# Patient Record
Sex: MALE | Race: WHITE
[De-identification: names, ages, dates, MRNs, and addresses within clinical notes are randomized per-mention and may not be internally consistent; named-entity substitution may affect disease eponyms.]

---

## 2019-08-03 ENCOUNTER — HOSPITAL ENCOUNTER (INPATIENT)
Dept: HOSPITAL 15 - ER | Age: 54
LOS: 1 days | Discharge: HOME HEALTH | DRG: 383 | End: 2019-08-04
Attending: NURSE PRACTITIONER | Admitting: NURSE PRACTITIONER
Payer: MEDICAID

## 2019-08-03 VITALS — WEIGHT: 223 LBS | BODY MASS INDEX: 33.8 KG/M2 | HEIGHT: 68 IN

## 2019-08-03 DIAGNOSIS — Z88.5: ICD-10-CM

## 2019-08-03 DIAGNOSIS — I16.1: ICD-10-CM

## 2019-08-03 DIAGNOSIS — Z79.899: ICD-10-CM

## 2019-08-03 DIAGNOSIS — L02.414: ICD-10-CM

## 2019-08-03 DIAGNOSIS — L03.114: Primary | ICD-10-CM

## 2019-08-03 DIAGNOSIS — L02.91: ICD-10-CM

## 2019-08-03 DIAGNOSIS — I10: ICD-10-CM

## 2019-08-03 DIAGNOSIS — E78.5: ICD-10-CM

## 2019-08-03 DIAGNOSIS — F17.210: ICD-10-CM

## 2019-08-03 LAB
ALBUMIN SERPL-MCNC: 2.9 G/DL (ref 3.4–5)
ALP SERPL-CCNC: 118 U/L (ref 45–117)
ALT SERPL-CCNC: 23 U/L (ref 16–61)
ANION GAP SERPL CALCULATED.3IONS-SCNC: 7 MMOL/L (ref 5–15)
BILIRUB SERPL-MCNC: 0.2 MG/DL (ref 0.2–1)
BUN SERPL-MCNC: 15 MG/DL (ref 7–18)
BUN/CREAT SERPL: 17
CALCIUM SERPL-MCNC: 8.9 MG/DL (ref 8.5–10.1)
CHLORIDE SERPL-SCNC: 105 MMOL/L (ref 98–107)
CO2 SERPL-SCNC: 28 MMOL/L (ref 21–32)
GLUCOSE SERPL-MCNC: 110 MG/DL (ref 74–106)
HCT VFR BLD AUTO: 45.5 % (ref 41–53)
HGB BLD-MCNC: 15.4 G/DL (ref 13.5–17.5)
MCH RBC QN AUTO: 30.5 PG (ref 28–32)
MCV RBC AUTO: 90.2 FL (ref 80–100)
NRBC BLD QL AUTO: 0 %
POTASSIUM SERPL-SCNC: 3.7 MMOL/L (ref 3.5–5.1)
PROT SERPL-MCNC: 7.4 G/DL (ref 6.4–8.2)
SODIUM SERPL-SCNC: 140 MMOL/L (ref 136–145)

## 2019-08-03 PROCEDURE — 96366 THER/PROPH/DIAG IV INF ADDON: CPT

## 2019-08-03 PROCEDURE — 87077 CULTURE AEROBIC IDENTIFY: CPT

## 2019-08-03 PROCEDURE — 87205 SMEAR GRAM STAIN: CPT

## 2019-08-03 PROCEDURE — 70450 CT HEAD/BRAIN W/O DYE: CPT

## 2019-08-03 PROCEDURE — 87186 SC STD MICRODIL/AGAR DIL: CPT

## 2019-08-03 PROCEDURE — 73200 CT UPPER EXTREMITY W/O DYE: CPT

## 2019-08-03 PROCEDURE — 83605 ASSAY OF LACTIC ACID: CPT

## 2019-08-03 PROCEDURE — 81001 URINALYSIS AUTO W/SCOPE: CPT

## 2019-08-03 PROCEDURE — 96365 THER/PROPH/DIAG IV INF INIT: CPT

## 2019-08-03 PROCEDURE — 80048 BASIC METABOLIC PNL TOTAL CA: CPT

## 2019-08-03 PROCEDURE — 36415 COLL VENOUS BLD VENIPUNCTURE: CPT

## 2019-08-03 PROCEDURE — 80053 COMPREHEN METABOLIC PANEL: CPT

## 2019-08-03 PROCEDURE — 85025 COMPLETE CBC W/AUTO DIFF WBC: CPT

## 2019-08-03 PROCEDURE — 87040 BLOOD CULTURE FOR BACTERIA: CPT

## 2019-08-03 PROCEDURE — 96367 TX/PROPH/DG ADDL SEQ IV INF: CPT

## 2019-08-03 PROCEDURE — 96375 TX/PRO/DX INJ NEW DRUG ADDON: CPT

## 2019-08-04 VITALS — DIASTOLIC BLOOD PRESSURE: 98 MMHG | SYSTOLIC BLOOD PRESSURE: 135 MMHG

## 2019-08-04 LAB
ANION GAP SERPL CALCULATED.3IONS-SCNC: 8 MMOL/L (ref 5–15)
BUN SERPL-MCNC: 16 MG/DL (ref 7–18)
BUN/CREAT SERPL: 20.5
CALCIUM SERPL-MCNC: 8.3 MG/DL (ref 8.5–10.1)
CHLORIDE SERPL-SCNC: 105 MMOL/L (ref 98–107)
CO2 SERPL-SCNC: 28 MMOL/L (ref 21–32)
GLUCOSE SERPL-MCNC: 116 MG/DL (ref 74–106)
HCT VFR BLD AUTO: 45.2 % (ref 41–53)
HGB BLD-MCNC: 15.3 G/DL (ref 13.5–17.5)
MCH RBC QN AUTO: 30.7 PG (ref 28–32)
MCV RBC AUTO: 90.8 FL (ref 80–100)
NRBC BLD QL AUTO: 0 %
POTASSIUM SERPL-SCNC: 3.3 MMOL/L (ref 3.5–5.1)
SODIUM SERPL-SCNC: 141 MMOL/L (ref 136–145)

## 2019-08-04 RX ADMIN — CLINDAMYCIN PHOSPHATE SCH MLS/HR: 150 INJECTION, SOLUTION INTRAVENOUS at 05:32

## 2019-08-04 RX ADMIN — CLINDAMYCIN PHOSPHATE SCH MLS/HR: 150 INJECTION, SOLUTION INTRAVENOUS at 14:12

## 2019-08-04 RX ADMIN — NICARDIPINE HYDROCHLORIDE SCH MLS/HR: 25 INJECTION INTRAVENOUS at 11:36

## 2019-08-04 RX ADMIN — NICARDIPINE HYDROCHLORIDE SCH MLS/HR: 25 INJECTION INTRAVENOUS at 07:14

## 2019-08-04 NOTE — NUR
WOUND CARE NOTE: 

Wound care in to see patient per wound care request regarding Left elbow wound.  Patient is  
54 years old male with admitting diagnosis of  Left Upper Extremity Cellulitis. He has 
history of High Lipids and Htn.  Patient is resting  in E.R. bed #6.  He's waiting for ICU 
bed. Patient's eyes are closed, respond to verbal and tactile stimuli. Mild pain noted upon 
touching his Lt upper extremity.  ER nurse Myrna reported that patient is ambulatory and 
able to turn and reposition self. His   Tera score is 17. 



Noted 4x5cm open full thickness wound with no measurable depth to patient's L elbow. Wound 
is bright red, edematous with yellow necrotic tissue at center. L elbow wound is draining 
moderate amount of serous drainage, no odor noted. Per RN  nursing notes, wound culture 
specimen already sent to lab for processing. Patient is very sleepy and unable to give 
information about his  elbow wound. FLORENCE Norris reported that wound is from suspected "spider 
bite". Cleansed patient's Lt elbow wound with wound cleanser, patted dry with sterile gauze, 
applied  Thera honey gel at wound base,covered with non-adhesive Opti foam, wrapped with 
Kerlix and secured with tape. Patient tolerated well. Unable to assess patient's sacral and 
back as patient is very sleepy, just medicated for nausea. Per FLORENCE Norris's report, patient 
can ambulate, turn and reposition himself and no other wound or pressure injury noted on 
skin examination.



RECOMMENDATION: Daily/PRN dressing change to L elbow wound per MD order,  Dietary consult, 
elevate  affected extremity on pillows,redistribute pressure points with pillows, continue 
monitoring by wound care while patient is hospitalized.  

-------------------------------------------------------------------------------

Addendum: 08/04/19 at 1242 by Maral Chan RN

-------------------------------------------------------------------------------

Amended: Links added.

## 2020-11-23 ENCOUNTER — HOSPITAL ENCOUNTER (INPATIENT)
Dept: HOSPITAL 15 - ER | Age: 55
LOS: 15 days | DRG: 130 | End: 2020-12-08
Attending: INTERNAL MEDICINE | Admitting: EMERGENCY MEDICINE
Payer: MEDICAID

## 2020-11-23 VITALS — BODY MASS INDEX: 30.9 KG/M2 | WEIGHT: 215.83 LBS | HEIGHT: 70 IN

## 2020-11-23 DIAGNOSIS — Z91.19: ICD-10-CM

## 2020-11-23 DIAGNOSIS — E66.9: ICD-10-CM

## 2020-11-23 DIAGNOSIS — F41.9: ICD-10-CM

## 2020-11-23 DIAGNOSIS — E87.6: ICD-10-CM

## 2020-11-23 DIAGNOSIS — I46.9: ICD-10-CM

## 2020-11-23 DIAGNOSIS — I48.0: ICD-10-CM

## 2020-11-23 DIAGNOSIS — Z88.5: ICD-10-CM

## 2020-11-23 DIAGNOSIS — I10: ICD-10-CM

## 2020-11-23 DIAGNOSIS — J12.89: ICD-10-CM

## 2020-11-23 DIAGNOSIS — J15.9: ICD-10-CM

## 2020-11-23 DIAGNOSIS — K21.9: ICD-10-CM

## 2020-11-23 DIAGNOSIS — J96.01: ICD-10-CM

## 2020-11-23 DIAGNOSIS — I27.20: ICD-10-CM

## 2020-11-23 DIAGNOSIS — F17.210: ICD-10-CM

## 2020-11-23 DIAGNOSIS — I25.2: ICD-10-CM

## 2020-11-23 DIAGNOSIS — B95.62: ICD-10-CM

## 2020-11-23 DIAGNOSIS — E78.5: ICD-10-CM

## 2020-11-23 DIAGNOSIS — R74.01: ICD-10-CM

## 2020-11-23 DIAGNOSIS — G93.41: ICD-10-CM

## 2020-11-23 DIAGNOSIS — N17.0: ICD-10-CM

## 2020-11-23 DIAGNOSIS — U07.1: Primary | ICD-10-CM

## 2020-11-23 LAB
ALBUMIN SERPL-MCNC: 3.4 G/DL (ref 3.4–5)
ALP SERPL-CCNC: 113 U/L (ref 45–117)
ALT SERPL-CCNC: 154 U/L (ref 16–61)
ANION GAP SERPL CALCULATED.3IONS-SCNC: 10 MMOL/L (ref 5–15)
BILIRUB SERPL-MCNC: 0.8 MG/DL (ref 0.2–1)
BUN SERPL-MCNC: 30 MG/DL (ref 7–18)
BUN/CREAT SERPL: 19.7
CALCIUM SERPL-MCNC: 9.1 MG/DL (ref 8.5–10.1)
CHLORIDE SERPL-SCNC: 100 MMOL/L (ref 98–107)
CO2 SERPL-SCNC: 25 MMOL/L (ref 21–32)
GLUCOSE SERPL-MCNC: 139 MG/DL (ref 74–106)
HCT VFR BLD AUTO: 54.8 % (ref 41–53)
HGB BLD-MCNC: 19.1 G/DL (ref 13.5–17.5)
MCH RBC QN AUTO: 32.2 PG (ref 28–32)
MCV RBC AUTO: 92.6 FL (ref 80–100)
NRBC BLD QL AUTO: 0.2 %
POTASSIUM SERPL-SCNC: 2.8 MMOL/L (ref 3.5–5.1)
PROT SERPL-MCNC: 8.9 G/DL (ref 6.4–8.2)
SODIUM SERPL-SCNC: 135 MMOL/L (ref 136–145)

## 2020-11-23 PROCEDURE — 94660 CPAP INITIATION&MGMT: CPT

## 2020-11-23 PROCEDURE — 80076 HEPATIC FUNCTION PANEL: CPT

## 2020-11-23 PROCEDURE — 71045 X-RAY EXAM CHEST 1 VIEW: CPT

## 2020-11-23 PROCEDURE — 85379 FIBRIN DEGRADATION QUANT: CPT

## 2020-11-23 PROCEDURE — 0BH17EZ INSERTION OF ENDOTRACHEAL AIRWAY INTO TRACHEA, VIA NATURAL OR ARTIFICIAL OPENING: ICD-10-PCS | Performed by: INTERNAL MEDICINE

## 2020-11-23 PROCEDURE — 81001 URINALYSIS AUTO W/SCOPE: CPT

## 2020-11-23 PROCEDURE — 94002 VENT MGMT INPAT INIT DAY: CPT

## 2020-11-23 PROCEDURE — 36600 WITHDRAWAL OF ARTERIAL BLOOD: CPT

## 2020-11-23 PROCEDURE — 93005 ELECTROCARDIOGRAM TRACING: CPT

## 2020-11-23 PROCEDURE — 94640 AIRWAY INHALATION TREATMENT: CPT

## 2020-11-23 PROCEDURE — 5A1955Z RESPIRATORY VENTILATION, GREATER THAN 96 CONSECUTIVE HOURS: ICD-10-PCS | Performed by: INTERNAL MEDICINE

## 2020-11-23 PROCEDURE — 85025 COMPLETE CBC W/AUTO DIFF WBC: CPT

## 2020-11-23 PROCEDURE — 86141 C-REACTIVE PROTEIN HS: CPT

## 2020-11-23 PROCEDURE — 94003 VENT MGMT INPAT SUBQ DAY: CPT

## 2020-11-23 PROCEDURE — 85730 THROMBOPLASTIN TIME PARTIAL: CPT

## 2020-11-23 PROCEDURE — 97110 THERAPEUTIC EXERCISES: CPT

## 2020-11-23 PROCEDURE — 82728 ASSAY OF FERRITIN: CPT

## 2020-11-23 PROCEDURE — 80048 BASIC METABOLIC PNL TOTAL CA: CPT

## 2020-11-23 PROCEDURE — 82962 GLUCOSE BLOOD TEST: CPT

## 2020-11-23 PROCEDURE — 87081 CULTURE SCREEN ONLY: CPT

## 2020-11-23 PROCEDURE — 83880 ASSAY OF NATRIURETIC PEPTIDE: CPT

## 2020-11-23 PROCEDURE — 87426 SARSCOV CORONAVIRUS AG IA: CPT

## 2020-11-23 PROCEDURE — 83615 LACTATE (LD) (LDH) ENZYME: CPT

## 2020-11-23 PROCEDURE — 93306 TTE W/DOPPLER COMPLETE: CPT

## 2020-11-23 PROCEDURE — 87186 SC STD MICRODIL/AGAR DIL: CPT

## 2020-11-23 PROCEDURE — 93970 EXTREMITY STUDY: CPT

## 2020-11-23 PROCEDURE — 71275 CT ANGIOGRAPHY CHEST: CPT

## 2020-11-23 PROCEDURE — 87205 SMEAR GRAM STAIN: CPT

## 2020-11-23 PROCEDURE — 85610 PROTHROMBIN TIME: CPT

## 2020-11-23 PROCEDURE — 83735 ASSAY OF MAGNESIUM: CPT

## 2020-11-23 PROCEDURE — 87070 CULTURE OTHR SPECIMN AEROBIC: CPT

## 2020-11-23 PROCEDURE — 82805 BLOOD GASES W/O2 SATURATION: CPT

## 2020-11-23 PROCEDURE — 36415 COLL VENOUS BLD VENIPUNCTURE: CPT

## 2020-11-23 PROCEDURE — 84484 ASSAY OF TROPONIN QUANT: CPT

## 2020-11-23 PROCEDURE — 87077 CULTURE AEROBIC IDENTIFY: CPT

## 2020-11-23 PROCEDURE — 02HV33Z INSERTION OF INFUSION DEVICE INTO SUPERIOR VENA CAVA, PERCUTANEOUS APPROACH: ICD-10-PCS

## 2020-11-23 PROCEDURE — 80053 COMPREHEN METABOLIC PANEL: CPT

## 2020-11-23 PROCEDURE — 97530 THERAPEUTIC ACTIVITIES: CPT

## 2020-11-23 RX ADMIN — POTASSIUM CHLORIDE SCH MLS/HR: 200 INJECTION, SOLUTION INTRAVENOUS at 17:07

## 2020-11-23 RX ADMIN — POTASSIUM CHLORIDE SCH MLS/HR: 200 INJECTION, SOLUTION INTRAVENOUS at 13:53

## 2020-11-23 RX ADMIN — ENOXAPARIN SODIUM SCH MG: 100 INJECTION SUBCUTANEOUS at 21:38

## 2020-11-23 RX ADMIN — BUDESONIDE SCH MCG: 180 AEROSOL, POWDER RESPIRATORY (INHALATION) at 19:36

## 2020-11-23 RX ADMIN — ALBUTEROL SULFATE SCH MCG: 108 AEROSOL, METERED RESPIRATORY (INHALATION) at 19:36

## 2020-11-23 RX ADMIN — ALBUTEROL SULFATE SCH MCG: 108 AEROSOL, METERED RESPIRATORY (INHALATION) at 14:25

## 2020-11-23 RX ADMIN — OXYCODONE HYDROCHLORIDE AND ACETAMINOPHEN SCH MG: 500 TABLET ORAL at 21:39

## 2020-11-24 VITALS — SYSTOLIC BLOOD PRESSURE: 139 MMHG | DIASTOLIC BLOOD PRESSURE: 101 MMHG

## 2020-11-24 LAB
ALBUMIN SERPL-MCNC: 2.8 G/DL (ref 3.4–5)
ALP SERPL-CCNC: 92 U/L (ref 45–117)
ALT SERPL-CCNC: 115 U/L (ref 16–61)
ANION GAP SERPL CALCULATED.3IONS-SCNC: 6 MMOL/L (ref 5–15)
ANION GAP SERPL CALCULATED.3IONS-SCNC: 8 MMOL/L (ref 5–15)
BILIRUB SERPL-MCNC: 0.4 MG/DL (ref 0.2–1)
BUN SERPL-MCNC: 24 MG/DL (ref 7–18)
BUN SERPL-MCNC: 25 MG/DL (ref 7–18)
BUN/CREAT SERPL: 23.8
BUN/CREAT SERPL: 24.5
CALCIUM SERPL-MCNC: 8 MG/DL (ref 8.5–10.1)
CALCIUM SERPL-MCNC: 8.3 MG/DL (ref 8.5–10.1)
CHLORIDE SERPL-SCNC: 106 MMOL/L (ref 98–107)
CHLORIDE SERPL-SCNC: 106 MMOL/L (ref 98–107)
CO2 SERPL-SCNC: 23 MMOL/L (ref 21–32)
CO2 SERPL-SCNC: 23 MMOL/L (ref 21–32)
GLUCOSE SERPL-MCNC: 150 MG/DL (ref 74–106)
GLUCOSE SERPL-MCNC: 167 MG/DL (ref 74–106)
HCT VFR BLD AUTO: 50.9 % (ref 41–53)
HGB BLD-MCNC: 17.1 G/DL (ref 13.5–17.5)
MAGNESIUM SERPL-MCNC: 2.1 MG/DL (ref 1.6–2.6)
MAGNESIUM SERPL-MCNC: 2.3 MG/DL (ref 1.6–2.6)
MCH RBC QN AUTO: 31.2 PG (ref 28–32)
MCV RBC AUTO: 92.8 FL (ref 80–100)
NRBC BLD QL AUTO: 0.8 %
POTASSIUM SERPL-SCNC: 3.5 MMOL/L (ref 3.5–5.1)
POTASSIUM SERPL-SCNC: 3.7 MMOL/L (ref 3.5–5.1)
PROT SERPL-MCNC: 7.8 G/DL (ref 6.4–8.2)
SODIUM SERPL-SCNC: 135 MMOL/L (ref 136–145)
SODIUM SERPL-SCNC: 137 MMOL/L (ref 136–145)

## 2020-11-24 RX ADMIN — ALBUTEROL SULFATE SCH MCG: 108 AEROSOL, METERED RESPIRATORY (INHALATION) at 06:00

## 2020-11-24 RX ADMIN — ALBUTEROL SULFATE SCH MCG: 108 AEROSOL, METERED RESPIRATORY (INHALATION) at 14:35

## 2020-11-24 RX ADMIN — PANTOPRAZOLE SODIUM SCH MG: 40 INJECTION, POWDER, FOR SOLUTION INTRAVENOUS at 10:31

## 2020-11-24 RX ADMIN — BUDESONIDE SCH MCG: 180 AEROSOL, POWDER RESPIRATORY (INHALATION) at 07:07

## 2020-11-24 RX ADMIN — OXYCODONE HYDROCHLORIDE AND ACETAMINOPHEN SCH MG: 500 TABLET ORAL at 10:32

## 2020-11-24 RX ADMIN — ZINC SULFATE CAP 220 MG (50 MG ELEMENTAL ZN) SCH MG: 220 (50 ZN) CAP at 10:31

## 2020-11-24 RX ADMIN — ENOXAPARIN SODIUM SCH MG: 100 INJECTION SUBCUTANEOUS at 10:32

## 2020-11-24 RX ADMIN — Medication SCH UNIT: at 10:32

## 2020-11-24 RX ADMIN — METOPROLOL TARTRATE SCH MG: 50 TABLET, FILM COATED ORAL at 22:28

## 2020-11-24 RX ADMIN — ALBUTEROL SULFATE SCH MCG: 108 AEROSOL, METERED RESPIRATORY (INHALATION) at 21:15

## 2020-11-24 RX ADMIN — ALBUTEROL SULFATE SCH MCG: 108 AEROSOL, METERED RESPIRATORY (INHALATION) at 07:07

## 2020-11-24 RX ADMIN — GUAIFENESIN AND DEXTROMETHORPHAN PRN ML: 100; 10 SYRUP ORAL at 15:19

## 2020-11-24 RX ADMIN — OXYCODONE HYDROCHLORIDE AND ACETAMINOPHEN SCH MG: 500 TABLET ORAL at 22:29

## 2020-11-24 RX ADMIN — GUAIFENESIN AND DEXTROMETHORPHAN PRN ML: 100; 10 SYRUP ORAL at 22:32

## 2020-11-24 RX ADMIN — BUDESONIDE SCH MCG: 180 AEROSOL, POWDER RESPIRATORY (INHALATION) at 21:15

## 2020-11-24 NOTE — NUR
PT ON  7L SM WITH SPO2 94% AND WAS EDUCATED REGARDING PRONING TO IMPROVE OXYGENATION. PT 
VERBALIZED UNDERSTANDING.

## 2020-11-25 LAB
ANION GAP SERPL CALCULATED.3IONS-SCNC: 8 MMOL/L (ref 5–15)
BUN SERPL-MCNC: 22 MG/DL (ref 7–18)
BUN/CREAT SERPL: 25
CALCIUM SERPL-MCNC: 7.9 MG/DL (ref 8.5–10.1)
CHLORIDE SERPL-SCNC: 105 MMOL/L (ref 98–107)
CO2 SERPL-SCNC: 24 MMOL/L (ref 21–32)
GLUCOSE SERPL-MCNC: 146 MG/DL (ref 74–106)
HCT VFR BLD AUTO: 47.8 % (ref 41–53)
HGB BLD-MCNC: 16.2 G/DL (ref 13.5–17.5)
MCH RBC QN AUTO: 31.7 PG (ref 28–32)
MCV RBC AUTO: 93.3 FL (ref 80–100)
NRBC BLD QL AUTO: 0.1 %
POTASSIUM SERPL-SCNC: 3.5 MMOL/L (ref 3.5–5.1)
SODIUM SERPL-SCNC: 137 MMOL/L (ref 136–145)

## 2020-11-25 PROCEDURE — XW033E5 INTRODUCTION OF REMDESIVIR ANTI-INFECTIVE INTO PERIPHERAL VEIN, PERCUTANEOUS APPROACH, NEW TECHNOLOGY GROUP 5: ICD-10-PCS | Performed by: INTERNAL MEDICINE

## 2020-11-25 RX ADMIN — ALBUTEROL SULFATE SCH MCG: 108 AEROSOL, METERED RESPIRATORY (INHALATION) at 14:32

## 2020-11-25 RX ADMIN — METOPROLOL TARTRATE SCH MG: 50 TABLET, FILM COATED ORAL at 12:03

## 2020-11-25 RX ADMIN — OXYCODONE HYDROCHLORIDE AND ACETAMINOPHEN SCH MG: 500 TABLET ORAL at 23:02

## 2020-11-25 RX ADMIN — HYDROCODONE BITARTRATE AND ACETAMINOPHEN PRN TAB: 5; 325 TABLET ORAL at 03:39

## 2020-11-25 RX ADMIN — ZINC SULFATE CAP 220 MG (50 MG ELEMENTAL ZN) SCH MG: 220 (50 ZN) CAP at 12:04

## 2020-11-25 RX ADMIN — Medication SCH UNIT: at 12:04

## 2020-11-25 RX ADMIN — GUAIFENESIN AND DEXTROMETHORPHAN PRN ML: 100; 10 SYRUP ORAL at 18:39

## 2020-11-25 RX ADMIN — ENOXAPARIN SODIUM SCH MG: 40 INJECTION SUBCUTANEOUS at 12:04

## 2020-11-25 RX ADMIN — OXYCODONE HYDROCHLORIDE AND ACETAMINOPHEN SCH MG: 500 TABLET ORAL at 12:04

## 2020-11-25 RX ADMIN — ALBUTEROL SULFATE SCH MCG: 108 AEROSOL, METERED RESPIRATORY (INHALATION) at 23:43

## 2020-11-25 RX ADMIN — GUAIFENESIN AND DEXTROMETHORPHAN PRN ML: 100; 10 SYRUP ORAL at 02:31

## 2020-11-25 RX ADMIN — BUDESONIDE SCH MCG: 180 AEROSOL, POWDER RESPIRATORY (INHALATION) at 08:17

## 2020-11-25 RX ADMIN — ALBUTEROL SULFATE SCH MCG: 108 AEROSOL, METERED RESPIRATORY (INHALATION) at 08:16

## 2020-11-25 RX ADMIN — BUDESONIDE SCH MCG: 180 AEROSOL, POWDER RESPIRATORY (INHALATION) at 23:43

## 2020-11-25 RX ADMIN — GUAIFENESIN AND DEXTROMETHORPHAN PRN ML: 100; 10 SYRUP ORAL at 07:42

## 2020-11-25 RX ADMIN — HYDROCODONE BITARTRATE AND ACETAMINOPHEN PRN TAB: 5; 325 TABLET ORAL at 04:45

## 2020-11-25 RX ADMIN — METOPROLOL TARTRATE SCH MG: 50 TABLET, FILM COATED ORAL at 23:02

## 2020-11-25 RX ADMIN — PANTOPRAZOLE SODIUM SCH MG: 40 INJECTION, POWDER, FOR SOLUTION INTRAVENOUS at 12:05

## 2020-11-26 VITALS — SYSTOLIC BLOOD PRESSURE: 141 MMHG | DIASTOLIC BLOOD PRESSURE: 123 MMHG

## 2020-11-26 VITALS — DIASTOLIC BLOOD PRESSURE: 77 MMHG | SYSTOLIC BLOOD PRESSURE: 118 MMHG

## 2020-11-26 VITALS — DIASTOLIC BLOOD PRESSURE: 79 MMHG | SYSTOLIC BLOOD PRESSURE: 114 MMHG

## 2020-11-26 VITALS — SYSTOLIC BLOOD PRESSURE: 121 MMHG | DIASTOLIC BLOOD PRESSURE: 90 MMHG

## 2020-11-26 VITALS — DIASTOLIC BLOOD PRESSURE: 113 MMHG | SYSTOLIC BLOOD PRESSURE: 186 MMHG

## 2020-11-26 LAB
ALBUMIN SERPL-MCNC: 3.1 G/DL (ref 3.4–5)
ALP SERPL-CCNC: 106 U/L (ref 45–117)
ALT SERPL-CCNC: 124 U/L (ref 16–61)
ANION GAP SERPL CALCULATED.3IONS-SCNC: 8 MMOL/L (ref 5–15)
BILIRUB DIRECT SERPL-MCNC: 0.4 MG/DL (ref 0–0.2)
BILIRUB SERPL-MCNC: 0.7 MG/DL (ref 0.2–1)
BUN SERPL-MCNC: 18 MG/DL (ref 7–18)
BUN/CREAT SERPL: 19.6
CALCIUM SERPL-MCNC: 8.4 MG/DL (ref 8.5–10.1)
CHLORIDE SERPL-SCNC: 101 MMOL/L (ref 98–107)
CO2 SERPL-SCNC: 26 MMOL/L (ref 21–32)
GLUCOSE SERPL-MCNC: 130 MG/DL (ref 74–106)
HCT VFR BLD AUTO: 51.9 % (ref 41–53)
HGB BLD-MCNC: 17.1 G/DL (ref 13.5–17.5)
MCH RBC QN AUTO: 30.9 PG (ref 28–32)
MCV RBC AUTO: 93.4 FL (ref 80–100)
NRBC BLD QL AUTO: 0.2 %
POTASSIUM SERPL-SCNC: 3.4 MMOL/L (ref 3.5–5.1)
PROT SERPL-MCNC: 8 G/DL (ref 6.4–8.2)
SODIUM SERPL-SCNC: 135 MMOL/L (ref 136–145)

## 2020-11-26 RX ADMIN — PROPOFOL SCH MLS/HR: 10 INJECTION, EMULSION INTRAVENOUS at 21:18

## 2020-11-26 RX ADMIN — PROPOFOL SCH MLS/HR: 10 INJECTION, EMULSION INTRAVENOUS at 23:30

## 2020-11-26 RX ADMIN — PROPOFOL SCH MLS/HR: 10 INJECTION, EMULSION INTRAVENOUS at 16:26

## 2020-11-26 RX ADMIN — GUAIFENESIN AND DEXTROMETHORPHAN PRN ML: 100; 10 SYRUP ORAL at 08:32

## 2020-11-26 RX ADMIN — SODIUM CHLORIDE SCH MLS/HR: 0.9 INJECTION, SOLUTION INTRAVENOUS at 16:27

## 2020-11-26 RX ADMIN — OXYCODONE HYDROCHLORIDE AND ACETAMINOPHEN SCH MG: 500 TABLET ORAL at 22:00

## 2020-11-26 RX ADMIN — BUDESONIDE SCH MCG: 180 AEROSOL, POWDER RESPIRATORY (INHALATION) at 06:40

## 2020-11-26 RX ADMIN — MIDAZOLAM SCH MLS/HR: 5 INJECTION, SOLUTION INTRAMUSCULAR; INTRAVENOUS at 23:30

## 2020-11-26 RX ADMIN — HYDRALAZINE HYDROCHLORIDE PRN MG: 20 INJECTION INTRAMUSCULAR; INTRAVENOUS at 01:03

## 2020-11-26 RX ADMIN — Medication SCH UNIT: at 08:32

## 2020-11-26 RX ADMIN — OXYCODONE HYDROCHLORIDE AND ACETAMINOPHEN SCH MG: 500 TABLET ORAL at 08:32

## 2020-11-26 RX ADMIN — BUDESONIDE SCH MCG: 180 AEROSOL, POWDER RESPIRATORY (INHALATION) at 22:00

## 2020-11-26 RX ADMIN — HYDRALAZINE HYDROCHLORIDE PRN MG: 20 INJECTION INTRAMUSCULAR; INTRAVENOUS at 09:06

## 2020-11-26 RX ADMIN — MIDAZOLAM SCH MLS/HR: 5 INJECTION, SOLUTION INTRAMUSCULAR; INTRAVENOUS at 10:55

## 2020-11-26 RX ADMIN — METOPROLOL TARTRATE SCH MG: 50 TABLET, FILM COATED ORAL at 00:40

## 2020-11-26 RX ADMIN — METOPROLOL TARTRATE SCH MG: 50 TABLET, FILM COATED ORAL at 22:00

## 2020-11-26 RX ADMIN — ZINC SULFATE CAP 220 MG (50 MG ELEMENTAL ZN) SCH MG: 220 (50 ZN) CAP at 08:32

## 2020-11-26 RX ADMIN — HYDROCODONE BITARTRATE AND ACETAMINOPHEN PRN TAB: 5; 325 TABLET ORAL at 09:05

## 2020-11-26 RX ADMIN — PROPOFOL SCH MLS/HR: 10 INJECTION, EMULSION INTRAVENOUS at 10:55

## 2020-11-26 RX ADMIN — ALBUTEROL SULFATE SCH MCG: 108 AEROSOL, METERED RESPIRATORY (INHALATION) at 06:40

## 2020-11-26 RX ADMIN — MIDAZOLAM SCH MLS/HR: 5 INJECTION, SOLUTION INTRAMUSCULAR; INTRAVENOUS at 16:42

## 2020-11-26 RX ADMIN — PANTOPRAZOLE SODIUM SCH MG: 40 INJECTION, POWDER, FOR SOLUTION INTRAVENOUS at 08:32

## 2020-11-26 RX ADMIN — ALBUTEROL SULFATE SCH MCG: 108 AEROSOL, METERED RESPIRATORY (INHALATION) at 22:00

## 2020-11-26 RX ADMIN — ENOXAPARIN SODIUM SCH MG: 40 INJECTION SUBCUTANEOUS at 08:32

## 2020-11-26 RX ADMIN — ALBUTEROL SULFATE SCH MCG: 108 AEROSOL, METERED RESPIRATORY (INHALATION) at 14:00

## 2020-11-26 RX ADMIN — MIDAZOLAM SCH MLS/HR: 5 INJECTION, SOLUTION INTRAMUSCULAR; INTRAVENOUS at 21:30

## 2020-11-27 VITALS — DIASTOLIC BLOOD PRESSURE: 58 MMHG | SYSTOLIC BLOOD PRESSURE: 120 MMHG

## 2020-11-27 VITALS — SYSTOLIC BLOOD PRESSURE: 130 MMHG | DIASTOLIC BLOOD PRESSURE: 83 MMHG

## 2020-11-27 VITALS — DIASTOLIC BLOOD PRESSURE: 100 MMHG | SYSTOLIC BLOOD PRESSURE: 145 MMHG

## 2020-11-27 VITALS — DIASTOLIC BLOOD PRESSURE: 89 MMHG | SYSTOLIC BLOOD PRESSURE: 118 MMHG

## 2020-11-27 VITALS — SYSTOLIC BLOOD PRESSURE: 122 MMHG | DIASTOLIC BLOOD PRESSURE: 70 MMHG

## 2020-11-27 VITALS — SYSTOLIC BLOOD PRESSURE: 126 MMHG | DIASTOLIC BLOOD PRESSURE: 90 MMHG

## 2020-11-27 VITALS — SYSTOLIC BLOOD PRESSURE: 124 MMHG | DIASTOLIC BLOOD PRESSURE: 78 MMHG

## 2020-11-27 VITALS — SYSTOLIC BLOOD PRESSURE: 123 MMHG | DIASTOLIC BLOOD PRESSURE: 75 MMHG

## 2020-11-27 LAB
ANION GAP SERPL CALCULATED.3IONS-SCNC: 8 MMOL/L (ref 5–15)
BUN SERPL-MCNC: 34 MG/DL (ref 7–18)
BUN/CREAT SERPL: 33
CALCIUM SERPL-MCNC: 7.9 MG/DL (ref 8.5–10.1)
CHLORIDE SERPL-SCNC: 101 MMOL/L (ref 98–107)
CO2 SERPL-SCNC: 27 MMOL/L (ref 21–32)
GLUCOSE SERPL-MCNC: 132 MG/DL (ref 74–106)
HCT VFR BLD AUTO: 49.7 % (ref 41–53)
HGB BLD-MCNC: 16.7 G/DL (ref 13.5–17.5)
MAGNESIUM SERPL-MCNC: 2.8 MG/DL (ref 1.6–2.6)
MCH RBC QN AUTO: 31.3 PG (ref 28–32)
MCV RBC AUTO: 93.4 FL (ref 80–100)
NRBC BLD QL AUTO: 0.1 %
POTASSIUM SERPL-SCNC: 3.2 MMOL/L (ref 3.5–5.1)
SODIUM SERPL-SCNC: 136 MMOL/L (ref 136–145)

## 2020-11-27 RX ADMIN — METOPROLOL TARTRATE SCH MG: 50 TABLET, FILM COATED ORAL at 10:00

## 2020-11-27 RX ADMIN — ALBUTEROL SULFATE SCH MG: 2.5 SOLUTION RESPIRATORY (INHALATION) at 23:26

## 2020-11-27 RX ADMIN — PROPOFOL SCH MLS/HR: 10 INJECTION, EMULSION INTRAVENOUS at 12:22

## 2020-11-27 RX ADMIN — BUDESONIDE SCH MG: 0.5 SUSPENSION RESPIRATORY (INHALATION) at 23:26

## 2020-11-27 RX ADMIN — ZINC SULFATE CAP 220 MG (50 MG ELEMENTAL ZN) SCH MG: 220 (50 ZN) CAP at 09:48

## 2020-11-27 RX ADMIN — OXYCODONE HYDROCHLORIDE AND ACETAMINOPHEN SCH MG: 500 TABLET ORAL at 22:28

## 2020-11-27 RX ADMIN — SODIUM CHLORIDE SCH MLS/HR: 0.9 INJECTION, SOLUTION INTRAVENOUS at 16:59

## 2020-11-27 RX ADMIN — PROPOFOL SCH MLS/HR: 10 INJECTION, EMULSION INTRAVENOUS at 06:00

## 2020-11-27 RX ADMIN — MIDAZOLAM SCH MLS/HR: 5 INJECTION, SOLUTION INTRAMUSCULAR; INTRAVENOUS at 23:49

## 2020-11-27 RX ADMIN — PROPOFOL SCH MLS/HR: 10 INJECTION, EMULSION INTRAVENOUS at 23:51

## 2020-11-27 RX ADMIN — Medication SCH UNIT: at 09:47

## 2020-11-27 RX ADMIN — PROPOFOL SCH MLS/HR: 10 INJECTION, EMULSION INTRAVENOUS at 16:58

## 2020-11-27 RX ADMIN — ENOXAPARIN SODIUM SCH MG: 40 INJECTION SUBCUTANEOUS at 09:47

## 2020-11-27 RX ADMIN — OXYCODONE HYDROCHLORIDE AND ACETAMINOPHEN SCH MG: 500 TABLET ORAL at 09:47

## 2020-11-27 RX ADMIN — MIDAZOLAM SCH MLS/HR: 5 INJECTION, SOLUTION INTRAMUSCULAR; INTRAVENOUS at 06:00

## 2020-11-27 RX ADMIN — PANTOPRAZOLE SODIUM SCH MG: 40 INJECTION, POWDER, FOR SOLUTION INTRAVENOUS at 09:47

## 2020-11-27 RX ADMIN — MIDAZOLAM SCH MLS/HR: 5 INJECTION, SOLUTION INTRAMUSCULAR; INTRAVENOUS at 15:27

## 2020-11-27 RX ADMIN — MIDAZOLAM SCH MLS/HR: 5 INJECTION, SOLUTION INTRAMUSCULAR; INTRAVENOUS at 19:56

## 2020-11-27 NOTE — NUR
WOUND CARE NOTE: NOTIFIED BY BEDSIDE NURSE OF PATIENT'S INTUBATION STATUS. PATIENT IN ER BED 
6, ICU STATUS. PATIENT AIRBORNE ISOLATION, ADMITTING DIAGNOSIS PNA, AFIB.  PER BEDSIDE 
NURSE, PATIENT IS WOUND FREE AT THIS TIME. IMPLEMENTED SKIN/WOUND CARE PLAN AT THIS TIME. 
ORDERED HOSPITAL BED FOR PATIENT. PATIENT TO BE PLACED, PENDING DELIVERY BY ORDERLY.  
CURRENT NGHIA SCORE 10. 



RECOMMEND: FREQUENT TURN SCHEDULE Q 2 HOURS PRN AS CONDITION PERMITS, WITH PRESSURE 
REDISTRIBUTION, USING PILLOWS/WEDGES; BID/PRN APPLICATION WITH MOISTURE BARRIER CREAM, 
OPTIFOAM GENTLE SACRAL DRESSING AS PREVENTATIVE; SKIN/WOUND CARE PLAN, DIETARY CONSULT FOR 
INTUBATION STATUS, CONTINUED MONITORING BY WOUND CARE TEAM.

## 2020-11-28 VITALS — DIASTOLIC BLOOD PRESSURE: 113 MMHG | SYSTOLIC BLOOD PRESSURE: 145 MMHG

## 2020-11-28 VITALS — DIASTOLIC BLOOD PRESSURE: 88 MMHG | SYSTOLIC BLOOD PRESSURE: 126 MMHG

## 2020-11-28 VITALS — SYSTOLIC BLOOD PRESSURE: 139 MMHG | DIASTOLIC BLOOD PRESSURE: 92 MMHG

## 2020-11-28 VITALS — SYSTOLIC BLOOD PRESSURE: 145 MMHG | DIASTOLIC BLOOD PRESSURE: 113 MMHG

## 2020-11-28 LAB
ALBUMIN SERPL-MCNC: 2.5 G/DL (ref 3.4–5)
ALP SERPL-CCNC: 89 U/L (ref 45–117)
ALT SERPL-CCNC: 93 U/L (ref 16–61)
ANION GAP SERPL CALCULATED.3IONS-SCNC: 7 MMOL/L (ref 5–15)
BILIRUB SERPL-MCNC: 0.5 MG/DL (ref 0.2–1)
BUN SERPL-MCNC: 32 MG/DL (ref 7–18)
BUN/CREAT SERPL: 29.4
CALCIUM SERPL-MCNC: 8 MG/DL (ref 8.5–10.1)
CHLORIDE SERPL-SCNC: 103 MMOL/L (ref 98–107)
CO2 SERPL-SCNC: 28 MMOL/L (ref 21–32)
GLUCOSE SERPL-MCNC: 135 MG/DL (ref 74–106)
HCT VFR BLD AUTO: 51.4 % (ref 41–53)
HGB BLD-MCNC: 17.2 G/DL (ref 13.5–17.5)
MAGNESIUM SERPL-MCNC: 3.1 MG/DL (ref 1.6–2.6)
MCH RBC QN AUTO: 31.5 PG (ref 28–32)
MCV RBC AUTO: 94 FL (ref 80–100)
NRBC BLD QL AUTO: 0.4 %
POTASSIUM SERPL-SCNC: 3.4 MMOL/L (ref 3.5–5.1)
PROT SERPL-MCNC: 7.3 G/DL (ref 6.4–8.2)
SODIUM SERPL-SCNC: 138 MMOL/L (ref 136–145)

## 2020-11-28 RX ADMIN — Medication SCH UNIT: at 10:54

## 2020-11-28 RX ADMIN — MIDAZOLAM SCH MLS/HR: 5 INJECTION, SOLUTION INTRAMUSCULAR; INTRAVENOUS at 18:38

## 2020-11-28 RX ADMIN — PANTOPRAZOLE SODIUM SCH MG: 40 INJECTION, POWDER, FOR SOLUTION INTRAVENOUS at 10:40

## 2020-11-28 RX ADMIN — ALBUTEROL SULFATE SCH MG: 2.5 SOLUTION RESPIRATORY (INHALATION) at 14:36

## 2020-11-28 RX ADMIN — PROPOFOL SCH MLS/HR: 10 INJECTION, EMULSION INTRAVENOUS at 18:38

## 2020-11-28 RX ADMIN — OXYCODONE HYDROCHLORIDE AND ACETAMINOPHEN SCH MG: 500 TABLET ORAL at 20:48

## 2020-11-28 RX ADMIN — MIDAZOLAM SCH MLS/HR: 5 INJECTION, SOLUTION INTRAMUSCULAR; INTRAVENOUS at 03:59

## 2020-11-28 RX ADMIN — BUDESONIDE SCH MG: 0.5 SUSPENSION RESPIRATORY (INHALATION) at 22:10

## 2020-11-28 RX ADMIN — BUDESONIDE SCH MG: 0.5 SUSPENSION RESPIRATORY (INHALATION) at 06:57

## 2020-11-28 RX ADMIN — MIDAZOLAM SCH MLS/HR: 5 INJECTION, SOLUTION INTRAMUSCULAR; INTRAVENOUS at 23:13

## 2020-11-28 RX ADMIN — ENOXAPARIN SODIUM SCH MG: 40 INJECTION SUBCUTANEOUS at 10:42

## 2020-11-28 RX ADMIN — PROPOFOL SCH MLS/HR: 10 INJECTION, EMULSION INTRAVENOUS at 08:32

## 2020-11-28 RX ADMIN — MIDAZOLAM SCH MLS/HR: 5 INJECTION, SOLUTION INTRAMUSCULAR; INTRAVENOUS at 08:32

## 2020-11-28 RX ADMIN — OXYCODONE HYDROCHLORIDE AND ACETAMINOPHEN SCH MG: 500 TABLET ORAL at 10:54

## 2020-11-28 RX ADMIN — PROPOFOL SCH MLS/HR: 10 INJECTION, EMULSION INTRAVENOUS at 22:24

## 2020-11-28 RX ADMIN — ZINC SULFATE CAP 220 MG (50 MG ELEMENTAL ZN) SCH MG: 220 (50 ZN) CAP at 10:54

## 2020-11-28 RX ADMIN — ALBUTEROL SULFATE SCH MG: 2.5 SOLUTION RESPIRATORY (INHALATION) at 06:57

## 2020-11-28 RX ADMIN — MIDAZOLAM SCH MLS/HR: 5 INJECTION, SOLUTION INTRAMUSCULAR; INTRAVENOUS at 15:05

## 2020-11-28 RX ADMIN — ALBUTEROL SULFATE SCH MG: 2.5 SOLUTION RESPIRATORY (INHALATION) at 22:10

## 2020-11-28 RX ADMIN — SODIUM CHLORIDE SCH MLS/HR: 0.9 INJECTION, SOLUTION INTRAVENOUS at 16:45

## 2020-11-28 RX ADMIN — PROPOFOL SCH MLS/HR: 10 INJECTION, EMULSION INTRAVENOUS at 15:04

## 2020-11-28 RX ADMIN — PROPOFOL SCH MLS/HR: 10 INJECTION, EMULSION INTRAVENOUS at 03:58

## 2020-11-28 NOTE — NUR
Nutrition Consult for Intubation



Consider Jevity 1.2 at a goal rate of 65 ml/hr 

Consider TPN if TF is not medically feasible 



Est energy needs 4031-0798 kcal (14-18 kcal/kg .4kg) Est protein needs 75-98g 
(1-1.3g/kg IBW 75kg) Will monitor and reassess prn. 

-------------------------------------------------------------------------------

Addendum: 11/28/20 at 1132 by TEE MULLINS RD

-------------------------------------------------------------------------------

Amended: Links added.

## 2020-11-29 VITALS — SYSTOLIC BLOOD PRESSURE: 161 MMHG | DIASTOLIC BLOOD PRESSURE: 103 MMHG

## 2020-11-29 VITALS — DIASTOLIC BLOOD PRESSURE: 99 MMHG | SYSTOLIC BLOOD PRESSURE: 139 MMHG

## 2020-11-29 VITALS — SYSTOLIC BLOOD PRESSURE: 153 MMHG | DIASTOLIC BLOOD PRESSURE: 105 MMHG

## 2020-11-29 VITALS — DIASTOLIC BLOOD PRESSURE: 110 MMHG | SYSTOLIC BLOOD PRESSURE: 142 MMHG

## 2020-11-29 VITALS — SYSTOLIC BLOOD PRESSURE: 135 MMHG | DIASTOLIC BLOOD PRESSURE: 107 MMHG

## 2020-11-29 VITALS — DIASTOLIC BLOOD PRESSURE: 103 MMHG | SYSTOLIC BLOOD PRESSURE: 140 MMHG

## 2020-11-29 LAB
ALBUMIN SERPL-MCNC: 2.6 G/DL (ref 3.4–5)
ALP SERPL-CCNC: 93 U/L (ref 45–117)
ALT SERPL-CCNC: 84 U/L (ref 16–61)
ANION GAP SERPL CALCULATED.3IONS-SCNC: 6 MMOL/L (ref 5–15)
BILIRUB SERPL-MCNC: 0.6 MG/DL (ref 0.2–1)
BUN SERPL-MCNC: 35 MG/DL (ref 7–18)
BUN/CREAT SERPL: 37.2
CALCIUM SERPL-MCNC: 8 MG/DL (ref 8.5–10.1)
CHLORIDE SERPL-SCNC: 104 MMOL/L (ref 98–107)
CO2 SERPL-SCNC: 29 MMOL/L (ref 21–32)
GLUCOSE SERPL-MCNC: 126 MG/DL (ref 74–106)
POTASSIUM SERPL-SCNC: 3.5 MMOL/L (ref 3.5–5.1)
PROT SERPL-MCNC: 7.6 G/DL (ref 6.4–8.2)
SODIUM SERPL-SCNC: 139 MMOL/L (ref 136–145)

## 2020-11-29 RX ADMIN — PROPOFOL SCH MLS/HR: 10 INJECTION, EMULSION INTRAVENOUS at 21:00

## 2020-11-29 RX ADMIN — ALBUTEROL SULFATE SCH MG: 2.5 SOLUTION RESPIRATORY (INHALATION) at 06:45

## 2020-11-29 RX ADMIN — MIDAZOLAM SCH MLS/HR: 5 INJECTION, SOLUTION INTRAMUSCULAR; INTRAVENOUS at 15:30

## 2020-11-29 RX ADMIN — ZINC SULFATE CAP 220 MG (50 MG ELEMENTAL ZN) SCH MG: 220 (50 ZN) CAP at 09:16

## 2020-11-29 RX ADMIN — MIDAZOLAM SCH MLS/HR: 5 INJECTION, SOLUTION INTRAMUSCULAR; INTRAVENOUS at 22:38

## 2020-11-29 RX ADMIN — Medication SCH UNIT: at 09:16

## 2020-11-29 RX ADMIN — ACETAMINOPHEN PRN MG: 650 SUPPOSITORY RECTAL at 21:30

## 2020-11-29 RX ADMIN — PROPOFOL SCH MLS/HR: 10 INJECTION, EMULSION INTRAVENOUS at 07:37

## 2020-11-29 RX ADMIN — PROPOFOL SCH MLS/HR: 10 INJECTION, EMULSION INTRAVENOUS at 00:47

## 2020-11-29 RX ADMIN — PROPOFOL SCH MLS/HR: 10 INJECTION, EMULSION INTRAVENOUS at 11:30

## 2020-11-29 RX ADMIN — BUDESONIDE SCH MG: 0.5 SUSPENSION RESPIRATORY (INHALATION) at 06:45

## 2020-11-29 RX ADMIN — PROPOFOL SCH MLS/HR: 10 INJECTION, EMULSION INTRAVENOUS at 15:30

## 2020-11-29 RX ADMIN — OXYCODONE HYDROCHLORIDE AND ACETAMINOPHEN SCH MG: 500 TABLET ORAL at 09:12

## 2020-11-29 RX ADMIN — ENOXAPARIN SODIUM SCH MG: 40 INJECTION SUBCUTANEOUS at 09:13

## 2020-11-29 RX ADMIN — MIDAZOLAM SCH MLS/HR: 5 INJECTION, SOLUTION INTRAMUSCULAR; INTRAVENOUS at 10:15

## 2020-11-29 RX ADMIN — PANTOPRAZOLE SODIUM SCH MG: 40 INJECTION, POWDER, FOR SOLUTION INTRAVENOUS at 09:12

## 2020-11-29 RX ADMIN — ALBUTEROL SULFATE SCH MG: 2.5 SOLUTION RESPIRATORY (INHALATION) at 22:33

## 2020-11-29 RX ADMIN — MIDAZOLAM SCH MLS/HR: 5 INJECTION, SOLUTION INTRAMUSCULAR; INTRAVENOUS at 04:30

## 2020-11-29 RX ADMIN — BUDESONIDE SCH MG: 0.5 SUSPENSION RESPIRATORY (INHALATION) at 22:33

## 2020-11-29 RX ADMIN — SODIUM CHLORIDE SCH MLS/HR: 0.9 INJECTION, SOLUTION INTRAVENOUS at 16:42

## 2020-11-29 RX ADMIN — OXYCODONE HYDROCHLORIDE AND ACETAMINOPHEN SCH MG: 500 TABLET ORAL at 22:31

## 2020-11-29 RX ADMIN — ALBUTEROL SULFATE SCH MG: 2.5 SOLUTION RESPIRATORY (INHALATION) at 12:52

## 2020-11-30 VITALS — SYSTOLIC BLOOD PRESSURE: 142 MMHG | DIASTOLIC BLOOD PRESSURE: 101 MMHG

## 2020-11-30 VITALS — DIASTOLIC BLOOD PRESSURE: 89 MMHG | SYSTOLIC BLOOD PRESSURE: 127 MMHG

## 2020-11-30 VITALS — SYSTOLIC BLOOD PRESSURE: 131 MMHG | DIASTOLIC BLOOD PRESSURE: 98 MMHG

## 2020-11-30 VITALS — DIASTOLIC BLOOD PRESSURE: 89 MMHG | SYSTOLIC BLOOD PRESSURE: 133 MMHG

## 2020-11-30 VITALS — SYSTOLIC BLOOD PRESSURE: 138 MMHG | DIASTOLIC BLOOD PRESSURE: 95 MMHG

## 2020-11-30 VITALS — SYSTOLIC BLOOD PRESSURE: 119 MMHG | DIASTOLIC BLOOD PRESSURE: 88 MMHG

## 2020-11-30 VITALS — SYSTOLIC BLOOD PRESSURE: 145 MMHG | DIASTOLIC BLOOD PRESSURE: 101 MMHG

## 2020-11-30 VITALS — DIASTOLIC BLOOD PRESSURE: 89 MMHG | SYSTOLIC BLOOD PRESSURE: 130 MMHG

## 2020-11-30 VITALS — SYSTOLIC BLOOD PRESSURE: 132 MMHG | DIASTOLIC BLOOD PRESSURE: 87 MMHG

## 2020-11-30 VITALS — SYSTOLIC BLOOD PRESSURE: 146 MMHG | DIASTOLIC BLOOD PRESSURE: 89 MMHG

## 2020-11-30 VITALS — DIASTOLIC BLOOD PRESSURE: 89 MMHG | SYSTOLIC BLOOD PRESSURE: 131 MMHG

## 2020-11-30 VITALS — DIASTOLIC BLOOD PRESSURE: 100 MMHG | SYSTOLIC BLOOD PRESSURE: 132 MMHG

## 2020-11-30 VITALS — SYSTOLIC BLOOD PRESSURE: 126 MMHG | DIASTOLIC BLOOD PRESSURE: 88 MMHG

## 2020-11-30 VITALS — DIASTOLIC BLOOD PRESSURE: 101 MMHG | SYSTOLIC BLOOD PRESSURE: 132 MMHG

## 2020-11-30 VITALS — DIASTOLIC BLOOD PRESSURE: 87 MMHG | SYSTOLIC BLOOD PRESSURE: 123 MMHG

## 2020-11-30 VITALS — DIASTOLIC BLOOD PRESSURE: 99 MMHG | SYSTOLIC BLOOD PRESSURE: 141 MMHG

## 2020-11-30 VITALS — SYSTOLIC BLOOD PRESSURE: 132 MMHG | DIASTOLIC BLOOD PRESSURE: 88 MMHG

## 2020-11-30 VITALS — DIASTOLIC BLOOD PRESSURE: 99 MMHG | SYSTOLIC BLOOD PRESSURE: 149 MMHG

## 2020-11-30 VITALS — SYSTOLIC BLOOD PRESSURE: 131 MMHG | DIASTOLIC BLOOD PRESSURE: 82 MMHG

## 2020-11-30 VITALS — SYSTOLIC BLOOD PRESSURE: 123 MMHG | DIASTOLIC BLOOD PRESSURE: 88 MMHG

## 2020-11-30 VITALS — DIASTOLIC BLOOD PRESSURE: 94 MMHG | SYSTOLIC BLOOD PRESSURE: 140 MMHG

## 2020-11-30 VITALS — DIASTOLIC BLOOD PRESSURE: 91 MMHG | SYSTOLIC BLOOD PRESSURE: 133 MMHG

## 2020-11-30 VITALS — DIASTOLIC BLOOD PRESSURE: 95 MMHG | SYSTOLIC BLOOD PRESSURE: 136 MMHG

## 2020-11-30 VITALS — SYSTOLIC BLOOD PRESSURE: 150 MMHG | DIASTOLIC BLOOD PRESSURE: 99 MMHG

## 2020-11-30 VITALS — DIASTOLIC BLOOD PRESSURE: 93 MMHG | SYSTOLIC BLOOD PRESSURE: 135 MMHG

## 2020-11-30 VITALS — DIASTOLIC BLOOD PRESSURE: 103 MMHG | SYSTOLIC BLOOD PRESSURE: 129 MMHG

## 2020-11-30 VITALS — SYSTOLIC BLOOD PRESSURE: 122 MMHG | DIASTOLIC BLOOD PRESSURE: 95 MMHG

## 2020-11-30 VITALS — DIASTOLIC BLOOD PRESSURE: 87 MMHG | SYSTOLIC BLOOD PRESSURE: 128 MMHG

## 2020-11-30 VITALS — DIASTOLIC BLOOD PRESSURE: 90 MMHG | SYSTOLIC BLOOD PRESSURE: 127 MMHG

## 2020-11-30 VITALS — DIASTOLIC BLOOD PRESSURE: 99 MMHG | SYSTOLIC BLOOD PRESSURE: 128 MMHG

## 2020-11-30 VITALS — DIASTOLIC BLOOD PRESSURE: 104 MMHG | SYSTOLIC BLOOD PRESSURE: 150 MMHG

## 2020-11-30 VITALS — SYSTOLIC BLOOD PRESSURE: 129 MMHG | DIASTOLIC BLOOD PRESSURE: 97 MMHG

## 2020-11-30 VITALS — DIASTOLIC BLOOD PRESSURE: 87 MMHG | SYSTOLIC BLOOD PRESSURE: 129 MMHG

## 2020-11-30 VITALS — SYSTOLIC BLOOD PRESSURE: 130 MMHG | DIASTOLIC BLOOD PRESSURE: 99 MMHG

## 2020-11-30 VITALS — SYSTOLIC BLOOD PRESSURE: 124 MMHG | DIASTOLIC BLOOD PRESSURE: 94 MMHG

## 2020-11-30 VITALS — SYSTOLIC BLOOD PRESSURE: 141 MMHG | DIASTOLIC BLOOD PRESSURE: 98 MMHG

## 2020-11-30 VITALS — DIASTOLIC BLOOD PRESSURE: 91 MMHG | SYSTOLIC BLOOD PRESSURE: 122 MMHG

## 2020-11-30 VITALS — DIASTOLIC BLOOD PRESSURE: 103 MMHG | SYSTOLIC BLOOD PRESSURE: 132 MMHG

## 2020-11-30 VITALS — SYSTOLIC BLOOD PRESSURE: 135 MMHG | DIASTOLIC BLOOD PRESSURE: 96 MMHG

## 2020-11-30 VITALS — DIASTOLIC BLOOD PRESSURE: 105 MMHG | SYSTOLIC BLOOD PRESSURE: 132 MMHG

## 2020-11-30 VITALS — SYSTOLIC BLOOD PRESSURE: 134 MMHG | DIASTOLIC BLOOD PRESSURE: 103 MMHG

## 2020-11-30 VITALS — SYSTOLIC BLOOD PRESSURE: 137 MMHG | DIASTOLIC BLOOD PRESSURE: 87 MMHG

## 2020-11-30 VITALS — DIASTOLIC BLOOD PRESSURE: 102 MMHG | SYSTOLIC BLOOD PRESSURE: 143 MMHG

## 2020-11-30 VITALS — SYSTOLIC BLOOD PRESSURE: 148 MMHG | DIASTOLIC BLOOD PRESSURE: 100 MMHG

## 2020-11-30 VITALS — SYSTOLIC BLOOD PRESSURE: 149 MMHG | DIASTOLIC BLOOD PRESSURE: 223 MMHG

## 2020-11-30 VITALS — DIASTOLIC BLOOD PRESSURE: 88 MMHG | SYSTOLIC BLOOD PRESSURE: 143 MMHG

## 2020-11-30 VITALS — DIASTOLIC BLOOD PRESSURE: 95 MMHG | SYSTOLIC BLOOD PRESSURE: 127 MMHG

## 2020-11-30 VITALS — SYSTOLIC BLOOD PRESSURE: 130 MMHG | DIASTOLIC BLOOD PRESSURE: 98 MMHG

## 2020-11-30 VITALS — DIASTOLIC BLOOD PRESSURE: 110 MMHG | SYSTOLIC BLOOD PRESSURE: 145 MMHG

## 2020-11-30 VITALS — SYSTOLIC BLOOD PRESSURE: 138 MMHG | DIASTOLIC BLOOD PRESSURE: 87 MMHG

## 2020-11-30 VITALS — SYSTOLIC BLOOD PRESSURE: 152 MMHG | DIASTOLIC BLOOD PRESSURE: 96 MMHG

## 2020-11-30 VITALS — DIASTOLIC BLOOD PRESSURE: 85 MMHG | SYSTOLIC BLOOD PRESSURE: 126 MMHG

## 2020-11-30 VITALS — SYSTOLIC BLOOD PRESSURE: 125 MMHG | DIASTOLIC BLOOD PRESSURE: 104 MMHG

## 2020-11-30 VITALS — DIASTOLIC BLOOD PRESSURE: 82 MMHG | SYSTOLIC BLOOD PRESSURE: 131 MMHG

## 2020-11-30 VITALS — DIASTOLIC BLOOD PRESSURE: 88 MMHG | SYSTOLIC BLOOD PRESSURE: 128 MMHG

## 2020-11-30 VITALS — DIASTOLIC BLOOD PRESSURE: 102 MMHG | SYSTOLIC BLOOD PRESSURE: 138 MMHG

## 2020-11-30 VITALS — SYSTOLIC BLOOD PRESSURE: 134 MMHG | DIASTOLIC BLOOD PRESSURE: 92 MMHG

## 2020-11-30 VITALS — DIASTOLIC BLOOD PRESSURE: 99 MMHG | SYSTOLIC BLOOD PRESSURE: 144 MMHG

## 2020-11-30 VITALS — DIASTOLIC BLOOD PRESSURE: 90 MMHG | SYSTOLIC BLOOD PRESSURE: 131 MMHG

## 2020-11-30 VITALS — DIASTOLIC BLOOD PRESSURE: 92 MMHG | SYSTOLIC BLOOD PRESSURE: 141 MMHG

## 2020-11-30 VITALS — SYSTOLIC BLOOD PRESSURE: 143 MMHG | DIASTOLIC BLOOD PRESSURE: 104 MMHG

## 2020-11-30 VITALS — SYSTOLIC BLOOD PRESSURE: 136 MMHG | DIASTOLIC BLOOD PRESSURE: 112 MMHG

## 2020-11-30 VITALS — SYSTOLIC BLOOD PRESSURE: 136 MMHG | DIASTOLIC BLOOD PRESSURE: 101 MMHG

## 2020-11-30 VITALS — DIASTOLIC BLOOD PRESSURE: 102 MMHG | SYSTOLIC BLOOD PRESSURE: 145 MMHG

## 2020-11-30 VITALS — SYSTOLIC BLOOD PRESSURE: 128 MMHG | DIASTOLIC BLOOD PRESSURE: 95 MMHG

## 2020-11-30 VITALS — SYSTOLIC BLOOD PRESSURE: 160 MMHG | DIASTOLIC BLOOD PRESSURE: 107 MMHG

## 2020-11-30 VITALS — DIASTOLIC BLOOD PRESSURE: 103 MMHG | SYSTOLIC BLOOD PRESSURE: 141 MMHG

## 2020-11-30 VITALS — DIASTOLIC BLOOD PRESSURE: 98 MMHG | SYSTOLIC BLOOD PRESSURE: 129 MMHG

## 2020-11-30 VITALS — DIASTOLIC BLOOD PRESSURE: 95 MMHG | SYSTOLIC BLOOD PRESSURE: 145 MMHG

## 2020-11-30 VITALS — DIASTOLIC BLOOD PRESSURE: 112 MMHG | SYSTOLIC BLOOD PRESSURE: 149 MMHG

## 2020-11-30 VITALS — DIASTOLIC BLOOD PRESSURE: 101 MMHG | SYSTOLIC BLOOD PRESSURE: 135 MMHG

## 2020-11-30 VITALS — DIASTOLIC BLOOD PRESSURE: 90 MMHG | SYSTOLIC BLOOD PRESSURE: 125 MMHG

## 2020-11-30 VITALS — SYSTOLIC BLOOD PRESSURE: 146 MMHG | DIASTOLIC BLOOD PRESSURE: 98 MMHG

## 2020-11-30 VITALS — DIASTOLIC BLOOD PRESSURE: 108 MMHG | SYSTOLIC BLOOD PRESSURE: 144 MMHG

## 2020-11-30 VITALS — SYSTOLIC BLOOD PRESSURE: 128 MMHG | DIASTOLIC BLOOD PRESSURE: 91 MMHG

## 2020-11-30 VITALS — DIASTOLIC BLOOD PRESSURE: 98 MMHG | SYSTOLIC BLOOD PRESSURE: 142 MMHG

## 2020-11-30 VITALS — SYSTOLIC BLOOD PRESSURE: 144 MMHG | DIASTOLIC BLOOD PRESSURE: 110 MMHG

## 2020-11-30 VITALS — SYSTOLIC BLOOD PRESSURE: 127 MMHG | DIASTOLIC BLOOD PRESSURE: 87 MMHG

## 2020-11-30 VITALS — DIASTOLIC BLOOD PRESSURE: 107 MMHG | SYSTOLIC BLOOD PRESSURE: 128 MMHG

## 2020-11-30 VITALS — DIASTOLIC BLOOD PRESSURE: 96 MMHG | SYSTOLIC BLOOD PRESSURE: 126 MMHG

## 2020-11-30 VITALS — DIASTOLIC BLOOD PRESSURE: 104 MMHG | SYSTOLIC BLOOD PRESSURE: 135 MMHG

## 2020-11-30 LAB
ANION GAP SERPL CALCULATED.3IONS-SCNC: 3 MMOL/L (ref 5–15)
BUN SERPL-MCNC: 44 MG/DL (ref 7–18)
BUN/CREAT SERPL: 38.3
CALCIUM SERPL-MCNC: 8.1 MG/DL (ref 8.5–10.1)
CHLORIDE SERPL-SCNC: 106 MMOL/L (ref 98–107)
CO2 SERPL-SCNC: 30 MMOL/L (ref 21–32)
GLUCOSE SERPL-MCNC: 146 MG/DL (ref 74–106)
HCT VFR BLD AUTO: 52.2 % (ref 41–53)
HGB BLD-MCNC: 17.3 G/DL (ref 13.5–17.5)
MAGNESIUM SERPL-MCNC: 3.2 MG/DL (ref 1.6–2.6)
MCH RBC QN AUTO: 31.4 PG (ref 28–32)
MCV RBC AUTO: 94.8 FL (ref 80–100)
NRBC BLD QL AUTO: 0.1 %
POTASSIUM SERPL-SCNC: 3.8 MMOL/L (ref 3.5–5.1)
SODIUM SERPL-SCNC: 139 MMOL/L (ref 136–145)

## 2020-11-30 RX ADMIN — ENOXAPARIN SODIUM SCH MG: 40 INJECTION SUBCUTANEOUS at 11:15

## 2020-11-30 RX ADMIN — MIDAZOLAM SCH MLS/HR: 5 INJECTION, SOLUTION INTRAMUSCULAR; INTRAVENOUS at 00:30

## 2020-11-30 RX ADMIN — OXYCODONE HYDROCHLORIDE AND ACETAMINOPHEN SCH MG: 500 TABLET ORAL at 22:00

## 2020-11-30 RX ADMIN — PROPOFOL SCH MLS/HR: 10 INJECTION, EMULSION INTRAVENOUS at 10:35

## 2020-11-30 RX ADMIN — MIDAZOLAM SCH MLS/HR: 5 INJECTION, SOLUTION INTRAMUSCULAR; INTRAVENOUS at 22:15

## 2020-11-30 RX ADMIN — PROPOFOL SCH MLS/HR: 10 INJECTION, EMULSION INTRAVENOUS at 14:55

## 2020-11-30 RX ADMIN — PROPOFOL SCH MLS/HR: 10 INJECTION, EMULSION INTRAVENOUS at 20:30

## 2020-11-30 RX ADMIN — OXYCODONE HYDROCHLORIDE AND ACETAMINOPHEN SCH MG: 500 TABLET ORAL at 11:15

## 2020-11-30 RX ADMIN — ALBUTEROL SULFATE SCH MG: 2.5 SOLUTION RESPIRATORY (INHALATION) at 13:38

## 2020-11-30 RX ADMIN — ENOXAPARIN SODIUM SCH MG: 100 INJECTION SUBCUTANEOUS at 01:30

## 2020-11-30 RX ADMIN — PANTOPRAZOLE SODIUM SCH MG: 40 INJECTION, POWDER, FOR SOLUTION INTRAVENOUS at 11:15

## 2020-11-30 RX ADMIN — MIDAZOLAM SCH MLS/HR: 5 INJECTION, SOLUTION INTRAMUSCULAR; INTRAVENOUS at 05:30

## 2020-11-30 RX ADMIN — MIDAZOLAM SCH MLS/HR: 5 INJECTION, SOLUTION INTRAMUSCULAR; INTRAVENOUS at 14:55

## 2020-11-30 RX ADMIN — BUDESONIDE SCH MG: 0.5 SUSPENSION RESPIRATORY (INHALATION) at 22:43

## 2020-11-30 RX ADMIN — ZINC SULFATE CAP 220 MG (50 MG ELEMENTAL ZN) SCH MG: 220 (50 ZN) CAP at 11:15

## 2020-11-30 RX ADMIN — PROPOFOL SCH MLS/HR: 10 INJECTION, EMULSION INTRAVENOUS at 23:30

## 2020-11-30 RX ADMIN — ALBUTEROL SULFATE SCH MG: 2.5 SOLUTION RESPIRATORY (INHALATION) at 05:58

## 2020-11-30 RX ADMIN — PROPOFOL SCH MLS/HR: 10 INJECTION, EMULSION INTRAVENOUS at 03:42

## 2020-11-30 RX ADMIN — BUDESONIDE SCH MG: 0.5 SUSPENSION RESPIRATORY (INHALATION) at 05:59

## 2020-11-30 RX ADMIN — MIDAZOLAM SCH MLS/HR: 5 INJECTION, SOLUTION INTRAMUSCULAR; INTRAVENOUS at 19:45

## 2020-11-30 RX ADMIN — MIDAZOLAM SCH MLS/HR: 5 INJECTION, SOLUTION INTRAMUSCULAR; INTRAVENOUS at 13:49

## 2020-11-30 RX ADMIN — ALBUTEROL SULFATE SCH MG: 2.5 SOLUTION RESPIRATORY (INHALATION) at 22:42

## 2020-11-30 RX ADMIN — Medication SCH UNIT: at 11:15

## 2020-11-30 RX ADMIN — HYDRALAZINE HYDROCHLORIDE PRN MG: 20 INJECTION INTRAMUSCULAR; INTRAVENOUS at 14:55

## 2020-11-30 NOTE — NUR
PATIENT ARRIVED ON UNIT:

PT. RECEIVED ON [facility] WITH ER RN, ER RT, AND ER TECH; V/S STABLE WITH PT. 
INTUBATED/SEDATED; PT. RR IN THE 30'S AND WILL ADJUST SEDATION AS PER PROTOCOL; WILL 
CONTINUE TO MONITOR.

## 2020-11-30 NOTE — NUR
Nutrition Followup Note



Pt wt 95.3kg



Pt is intubated and sedated in ICU now with propofol running at 20.412 ml/hr providing 539 
kcal from lipids.  Pt is NPO with no alternate nutrition ordered.  Consider starting pt on 
TF of Jevity 1.2 at a goal rate of 65 ml/hr when medically feasible  



Est energy needs 9455-6031 kcal (14-18 kcal/kg .4kg) Est protein needs 75-98g 
(1-1.3g/kg IBW 75kg) Will monitor and reassess prn. 



Labs:  BUN 44H, GLUC 146H, Alb 2.6L, Ca 8.1H



BM:  Pt with no BM noted per RN note 



Skin:  BS 10 high risk, full details in RN wound care note 



PES:  Obesity r/t caloric intake excess of needs aeb pt with a BMI of 35.9kg/m2

Inadequate oral intake r/t current medical condition aeb pt NPO, intubated and sedated



Comments:  1) Continue to monitor npo status, labs, skin 2) refer pt to OPD on DC 3)Continue 
current plan of care



Expected Outcomes/Goals: 1) Consider starting pt on alternate nutrition if pt is NPO >48hrs 
2) Consider Jevity 1.2 at a goal rate of 65 ml/hr 3) Consider TPN if TF is not medically 
feasible 4) advance diet as medically feasible 5) f/u 2-3 days

## 2020-11-30 NOTE — NUR
RECEIVED PT ON VENT V20, VENT CHECK DONE FROM PTS ROOM DOOR DUE TO COVID PRECAUTIONS. VENT 
CONNECTED TO RED OUTLET AND O2 SOURCE ALARMS ARE SET AND AUDIBLE. AMBU BAG AND MASK AT 
BEDSIDE.  PTS CURRENT TEMP .2F. COOLING MEASURES BEING TAKEN AT THIS TIME. NO CHANGES 
MADE WILL CONTINUE TO MONITOR.

## 2020-11-30 NOTE — NUR
Respiratory note:

AT BEDSIDE IN FULL PPE DUE TO COVID PRECAUTIONS. MED NEB TX GIVEN VIA AEROGEN WITH OUT 
ADVERSE REACTION NOTED. PTS CURRENT TEMP 99.9F. NO VENT CHANGES MADE, WILL CONTINUE TO 
MONITOR.

## 2020-11-30 NOTE — NUR
Opening Shift Note:

Neuro: bilateral pupils are 4 mm/brisk/reactive; moves all extremities with severe weakness 
with painful stimuli; does not open eyes; absent cough/gag. Cardio: ST 100s-110s; SBPs 
120s-140s, DBPs 80s-100s; no edema noted; pulses all palpable. Respiratory: ET size 8.0/26 @ 
lip and was intubated by Dr. Leblanc in ER on 11/26/20 related to increased WOB/not 
tolerating high flow; settings: AC rate 20, vT 500, FiO2 40%, and PEEP 8; anterior lung 
sounds are diminished throughout; ET and oral secretions are small/thick/creamy. GI: right 
nare NGT to LIS: dark brown/green output; hypoactive BS; last BM per report was on 11/22/20. 
: mooney inserted on 11/26/20 for strict I/O: dark lidia/cloudy/sediment. Skin: blanchable 
redness to sacrum: optifoam applied; generalized scabbing and bruising. IV: left hand 20 g 
IID inserted on 11/23/20; right IJ TLC inserted on 11/26/20 running Propofol @ 50 and Versed 
@ 15. Patient has received x2 doses of Remdesivir per pharmacy orders. Pending 
ABG/CBC/BMP/Mag/DDimer/C-reactive protein/CXR in AM. Patient is COVID positive: all 
precautions and isolation steps are initiated. Will continue to round, reposition, and 
perform oral care prn.

## 2020-12-01 VITALS — SYSTOLIC BLOOD PRESSURE: 119 MMHG | DIASTOLIC BLOOD PRESSURE: 90 MMHG

## 2020-12-01 VITALS — SYSTOLIC BLOOD PRESSURE: 140 MMHG | DIASTOLIC BLOOD PRESSURE: 96 MMHG

## 2020-12-01 VITALS — DIASTOLIC BLOOD PRESSURE: 102 MMHG | SYSTOLIC BLOOD PRESSURE: 137 MMHG

## 2020-12-01 VITALS — SYSTOLIC BLOOD PRESSURE: 130 MMHG | DIASTOLIC BLOOD PRESSURE: 87 MMHG

## 2020-12-01 VITALS — DIASTOLIC BLOOD PRESSURE: 88 MMHG | SYSTOLIC BLOOD PRESSURE: 119 MMHG

## 2020-12-01 VITALS — DIASTOLIC BLOOD PRESSURE: 95 MMHG | SYSTOLIC BLOOD PRESSURE: 139 MMHG

## 2020-12-01 VITALS — SYSTOLIC BLOOD PRESSURE: 141 MMHG | DIASTOLIC BLOOD PRESSURE: 100 MMHG

## 2020-12-01 VITALS — SYSTOLIC BLOOD PRESSURE: 113 MMHG | DIASTOLIC BLOOD PRESSURE: 84 MMHG

## 2020-12-01 VITALS — SYSTOLIC BLOOD PRESSURE: 117 MMHG | DIASTOLIC BLOOD PRESSURE: 87 MMHG

## 2020-12-01 VITALS — DIASTOLIC BLOOD PRESSURE: 85 MMHG | SYSTOLIC BLOOD PRESSURE: 115 MMHG

## 2020-12-01 VITALS — SYSTOLIC BLOOD PRESSURE: 115 MMHG | DIASTOLIC BLOOD PRESSURE: 86 MMHG

## 2020-12-01 VITALS — SYSTOLIC BLOOD PRESSURE: 130 MMHG | DIASTOLIC BLOOD PRESSURE: 95 MMHG

## 2020-12-01 VITALS — DIASTOLIC BLOOD PRESSURE: 87 MMHG | SYSTOLIC BLOOD PRESSURE: 120 MMHG

## 2020-12-01 VITALS — SYSTOLIC BLOOD PRESSURE: 146 MMHG | DIASTOLIC BLOOD PRESSURE: 98 MMHG

## 2020-12-01 VITALS — DIASTOLIC BLOOD PRESSURE: 99 MMHG | SYSTOLIC BLOOD PRESSURE: 142 MMHG

## 2020-12-01 VITALS — DIASTOLIC BLOOD PRESSURE: 97 MMHG | SYSTOLIC BLOOD PRESSURE: 139 MMHG

## 2020-12-01 VITALS — DIASTOLIC BLOOD PRESSURE: 85 MMHG | SYSTOLIC BLOOD PRESSURE: 119 MMHG

## 2020-12-01 VITALS — SYSTOLIC BLOOD PRESSURE: 119 MMHG | DIASTOLIC BLOOD PRESSURE: 80 MMHG

## 2020-12-01 VITALS — DIASTOLIC BLOOD PRESSURE: 94 MMHG | SYSTOLIC BLOOD PRESSURE: 128 MMHG

## 2020-12-01 VITALS — SYSTOLIC BLOOD PRESSURE: 116 MMHG | DIASTOLIC BLOOD PRESSURE: 87 MMHG

## 2020-12-01 VITALS — SYSTOLIC BLOOD PRESSURE: 146 MMHG | DIASTOLIC BLOOD PRESSURE: 99 MMHG

## 2020-12-01 VITALS — SYSTOLIC BLOOD PRESSURE: 119 MMHG | DIASTOLIC BLOOD PRESSURE: 96 MMHG

## 2020-12-01 VITALS — DIASTOLIC BLOOD PRESSURE: 96 MMHG | SYSTOLIC BLOOD PRESSURE: 142 MMHG

## 2020-12-01 VITALS — DIASTOLIC BLOOD PRESSURE: 92 MMHG | SYSTOLIC BLOOD PRESSURE: 137 MMHG

## 2020-12-01 VITALS — SYSTOLIC BLOOD PRESSURE: 141 MMHG | DIASTOLIC BLOOD PRESSURE: 99 MMHG

## 2020-12-01 VITALS — DIASTOLIC BLOOD PRESSURE: 94 MMHG | SYSTOLIC BLOOD PRESSURE: 123 MMHG

## 2020-12-01 VITALS — SYSTOLIC BLOOD PRESSURE: 115 MMHG | DIASTOLIC BLOOD PRESSURE: 89 MMHG

## 2020-12-01 VITALS — DIASTOLIC BLOOD PRESSURE: 90 MMHG | SYSTOLIC BLOOD PRESSURE: 119 MMHG

## 2020-12-01 VITALS — DIASTOLIC BLOOD PRESSURE: 111 MMHG | SYSTOLIC BLOOD PRESSURE: 145 MMHG

## 2020-12-01 VITALS — SYSTOLIC BLOOD PRESSURE: 141 MMHG | DIASTOLIC BLOOD PRESSURE: 105 MMHG

## 2020-12-01 VITALS — SYSTOLIC BLOOD PRESSURE: 138 MMHG | DIASTOLIC BLOOD PRESSURE: 97 MMHG

## 2020-12-01 VITALS — DIASTOLIC BLOOD PRESSURE: 82 MMHG | SYSTOLIC BLOOD PRESSURE: 121 MMHG

## 2020-12-01 VITALS — DIASTOLIC BLOOD PRESSURE: 90 MMHG | SYSTOLIC BLOOD PRESSURE: 115 MMHG

## 2020-12-01 VITALS — DIASTOLIC BLOOD PRESSURE: 95 MMHG | SYSTOLIC BLOOD PRESSURE: 131 MMHG

## 2020-12-01 VITALS — SYSTOLIC BLOOD PRESSURE: 119 MMHG | DIASTOLIC BLOOD PRESSURE: 88 MMHG

## 2020-12-01 VITALS — DIASTOLIC BLOOD PRESSURE: 105 MMHG | SYSTOLIC BLOOD PRESSURE: 144 MMHG

## 2020-12-01 VITALS — DIASTOLIC BLOOD PRESSURE: 99 MMHG | SYSTOLIC BLOOD PRESSURE: 136 MMHG

## 2020-12-01 VITALS — SYSTOLIC BLOOD PRESSURE: 137 MMHG | DIASTOLIC BLOOD PRESSURE: 89 MMHG

## 2020-12-01 VITALS — DIASTOLIC BLOOD PRESSURE: 84 MMHG | SYSTOLIC BLOOD PRESSURE: 119 MMHG

## 2020-12-01 VITALS — SYSTOLIC BLOOD PRESSURE: 119 MMHG | DIASTOLIC BLOOD PRESSURE: 94 MMHG

## 2020-12-01 VITALS — SYSTOLIC BLOOD PRESSURE: 117 MMHG | DIASTOLIC BLOOD PRESSURE: 86 MMHG

## 2020-12-01 VITALS — SYSTOLIC BLOOD PRESSURE: 138 MMHG | DIASTOLIC BLOOD PRESSURE: 107 MMHG

## 2020-12-01 VITALS — DIASTOLIC BLOOD PRESSURE: 107 MMHG | SYSTOLIC BLOOD PRESSURE: 146 MMHG

## 2020-12-01 VITALS — DIASTOLIC BLOOD PRESSURE: 89 MMHG | SYSTOLIC BLOOD PRESSURE: 120 MMHG

## 2020-12-01 VITALS — SYSTOLIC BLOOD PRESSURE: 125 MMHG | DIASTOLIC BLOOD PRESSURE: 93 MMHG

## 2020-12-01 VITALS — SYSTOLIC BLOOD PRESSURE: 146 MMHG | DIASTOLIC BLOOD PRESSURE: 93 MMHG

## 2020-12-01 VITALS — SYSTOLIC BLOOD PRESSURE: 119 MMHG | DIASTOLIC BLOOD PRESSURE: 85 MMHG

## 2020-12-01 VITALS — DIASTOLIC BLOOD PRESSURE: 91 MMHG | SYSTOLIC BLOOD PRESSURE: 126 MMHG

## 2020-12-01 VITALS — DIASTOLIC BLOOD PRESSURE: 95 MMHG | SYSTOLIC BLOOD PRESSURE: 147 MMHG

## 2020-12-01 VITALS — DIASTOLIC BLOOD PRESSURE: 89 MMHG | SYSTOLIC BLOOD PRESSURE: 116 MMHG

## 2020-12-01 VITALS — DIASTOLIC BLOOD PRESSURE: 85 MMHG | SYSTOLIC BLOOD PRESSURE: 126 MMHG

## 2020-12-01 VITALS — DIASTOLIC BLOOD PRESSURE: 90 MMHG | SYSTOLIC BLOOD PRESSURE: 116 MMHG

## 2020-12-01 VITALS — SYSTOLIC BLOOD PRESSURE: 129 MMHG | DIASTOLIC BLOOD PRESSURE: 93 MMHG

## 2020-12-01 VITALS — SYSTOLIC BLOOD PRESSURE: 121 MMHG | DIASTOLIC BLOOD PRESSURE: 89 MMHG

## 2020-12-01 VITALS — DIASTOLIC BLOOD PRESSURE: 93 MMHG | SYSTOLIC BLOOD PRESSURE: 128 MMHG

## 2020-12-01 VITALS — SYSTOLIC BLOOD PRESSURE: 152 MMHG | DIASTOLIC BLOOD PRESSURE: 104 MMHG

## 2020-12-01 VITALS — SYSTOLIC BLOOD PRESSURE: 145 MMHG | DIASTOLIC BLOOD PRESSURE: 103 MMHG

## 2020-12-01 VITALS — SYSTOLIC BLOOD PRESSURE: 130 MMHG | DIASTOLIC BLOOD PRESSURE: 94 MMHG

## 2020-12-01 VITALS — SYSTOLIC BLOOD PRESSURE: 123 MMHG | DIASTOLIC BLOOD PRESSURE: 87 MMHG

## 2020-12-01 VITALS — DIASTOLIC BLOOD PRESSURE: 96 MMHG | SYSTOLIC BLOOD PRESSURE: 137 MMHG

## 2020-12-01 VITALS — DIASTOLIC BLOOD PRESSURE: 101 MMHG | SYSTOLIC BLOOD PRESSURE: 147 MMHG

## 2020-12-01 VITALS — SYSTOLIC BLOOD PRESSURE: 153 MMHG | DIASTOLIC BLOOD PRESSURE: 106 MMHG

## 2020-12-01 VITALS — DIASTOLIC BLOOD PRESSURE: 94 MMHG | SYSTOLIC BLOOD PRESSURE: 141 MMHG

## 2020-12-01 VITALS — SYSTOLIC BLOOD PRESSURE: 137 MMHG | DIASTOLIC BLOOD PRESSURE: 99 MMHG

## 2020-12-01 VITALS — SYSTOLIC BLOOD PRESSURE: 136 MMHG | DIASTOLIC BLOOD PRESSURE: 93 MMHG

## 2020-12-01 VITALS — SYSTOLIC BLOOD PRESSURE: 138 MMHG | DIASTOLIC BLOOD PRESSURE: 99 MMHG

## 2020-12-01 VITALS — DIASTOLIC BLOOD PRESSURE: 103 MMHG | SYSTOLIC BLOOD PRESSURE: 140 MMHG

## 2020-12-01 VITALS — DIASTOLIC BLOOD PRESSURE: 90 MMHG | SYSTOLIC BLOOD PRESSURE: 140 MMHG

## 2020-12-01 VITALS — SYSTOLIC BLOOD PRESSURE: 147 MMHG | DIASTOLIC BLOOD PRESSURE: 102 MMHG

## 2020-12-01 VITALS — SYSTOLIC BLOOD PRESSURE: 134 MMHG | DIASTOLIC BLOOD PRESSURE: 103 MMHG

## 2020-12-01 VITALS — DIASTOLIC BLOOD PRESSURE: 88 MMHG | SYSTOLIC BLOOD PRESSURE: 118 MMHG

## 2020-12-01 VITALS — SYSTOLIC BLOOD PRESSURE: 134 MMHG | DIASTOLIC BLOOD PRESSURE: 99 MMHG

## 2020-12-01 VITALS — DIASTOLIC BLOOD PRESSURE: 101 MMHG | SYSTOLIC BLOOD PRESSURE: 146 MMHG

## 2020-12-01 VITALS — DIASTOLIC BLOOD PRESSURE: 85 MMHG | SYSTOLIC BLOOD PRESSURE: 112 MMHG

## 2020-12-01 VITALS — DIASTOLIC BLOOD PRESSURE: 95 MMHG | SYSTOLIC BLOOD PRESSURE: 130 MMHG

## 2020-12-01 VITALS — SYSTOLIC BLOOD PRESSURE: 143 MMHG | DIASTOLIC BLOOD PRESSURE: 104 MMHG

## 2020-12-01 VITALS — SYSTOLIC BLOOD PRESSURE: 132 MMHG | DIASTOLIC BLOOD PRESSURE: 82 MMHG

## 2020-12-01 VITALS — DIASTOLIC BLOOD PRESSURE: 88 MMHG | SYSTOLIC BLOOD PRESSURE: 124 MMHG

## 2020-12-01 VITALS — SYSTOLIC BLOOD PRESSURE: 118 MMHG | DIASTOLIC BLOOD PRESSURE: 86 MMHG

## 2020-12-01 VITALS — DIASTOLIC BLOOD PRESSURE: 102 MMHG | SYSTOLIC BLOOD PRESSURE: 129 MMHG

## 2020-12-01 VITALS — SYSTOLIC BLOOD PRESSURE: 121 MMHG | DIASTOLIC BLOOD PRESSURE: 86 MMHG

## 2020-12-01 VITALS — DIASTOLIC BLOOD PRESSURE: 46 MMHG | SYSTOLIC BLOOD PRESSURE: 92 MMHG

## 2020-12-01 VITALS — SYSTOLIC BLOOD PRESSURE: 127 MMHG | DIASTOLIC BLOOD PRESSURE: 93 MMHG

## 2020-12-01 VITALS — DIASTOLIC BLOOD PRESSURE: 93 MMHG | SYSTOLIC BLOOD PRESSURE: 123 MMHG

## 2020-12-01 VITALS — SYSTOLIC BLOOD PRESSURE: 117 MMHG | DIASTOLIC BLOOD PRESSURE: 84 MMHG

## 2020-12-01 VITALS — DIASTOLIC BLOOD PRESSURE: 96 MMHG | SYSTOLIC BLOOD PRESSURE: 124 MMHG

## 2020-12-01 VITALS — SYSTOLIC BLOOD PRESSURE: 145 MMHG | DIASTOLIC BLOOD PRESSURE: 101 MMHG

## 2020-12-01 VITALS — SYSTOLIC BLOOD PRESSURE: 131 MMHG | DIASTOLIC BLOOD PRESSURE: 88 MMHG

## 2020-12-01 VITALS — SYSTOLIC BLOOD PRESSURE: 142 MMHG | DIASTOLIC BLOOD PRESSURE: 96 MMHG

## 2020-12-01 VITALS — SYSTOLIC BLOOD PRESSURE: 121 MMHG | DIASTOLIC BLOOD PRESSURE: 87 MMHG

## 2020-12-01 VITALS — DIASTOLIC BLOOD PRESSURE: 87 MMHG | SYSTOLIC BLOOD PRESSURE: 119 MMHG

## 2020-12-01 LAB
ANION GAP SERPL CALCULATED.3IONS-SCNC: 6 MMOL/L (ref 5–15)
BUN SERPL-MCNC: 36 MG/DL (ref 7–18)
BUN/CREAT SERPL: 35.6
CALCIUM SERPL-MCNC: 8.1 MG/DL (ref 8.5–10.1)
CHLORIDE SERPL-SCNC: 107 MMOL/L (ref 98–107)
CO2 SERPL-SCNC: 31 MMOL/L (ref 21–32)
GLUCOSE SERPL-MCNC: 137 MG/DL (ref 74–106)
HCT VFR BLD AUTO: 52.2 % (ref 41–53)
HGB BLD-MCNC: 16.8 G/DL (ref 13.5–17.5)
MAGNESIUM SERPL-MCNC: 2.7 MG/DL (ref 1.6–2.6)
MCH RBC QN AUTO: 30.9 PG (ref 28–32)
MCV RBC AUTO: 95.7 FL (ref 80–100)
NRBC BLD QL AUTO: 0.1 %
POTASSIUM SERPL-SCNC: 4 MMOL/L (ref 3.5–5.1)
SODIUM SERPL-SCNC: 144 MMOL/L (ref 136–145)

## 2020-12-01 RX ADMIN — ACETAMINOPHEN PRN MG: 650 SUPPOSITORY RECTAL at 01:42

## 2020-12-01 RX ADMIN — MIDAZOLAM SCH MLS/HR: 5 INJECTION, SOLUTION INTRAMUSCULAR; INTRAVENOUS at 02:04

## 2020-12-01 RX ADMIN — ALBUTEROL SULFATE SCH MG: 2.5 SOLUTION RESPIRATORY (INHALATION) at 22:18

## 2020-12-01 RX ADMIN — Medication SCH UNIT: at 09:54

## 2020-12-01 RX ADMIN — BUDESONIDE SCH MG: 0.5 SUSPENSION RESPIRATORY (INHALATION) at 22:18

## 2020-12-01 RX ADMIN — OXYCODONE HYDROCHLORIDE AND ACETAMINOPHEN SCH MG: 500 TABLET ORAL at 09:54

## 2020-12-01 RX ADMIN — MIDAZOLAM SCH MLS/HR: 5 INJECTION, SOLUTION INTRAMUSCULAR; INTRAVENOUS at 21:29

## 2020-12-01 RX ADMIN — ZINC SULFATE CAP 220 MG (50 MG ELEMENTAL ZN) SCH MG: 220 (50 ZN) CAP at 09:53

## 2020-12-01 RX ADMIN — BUDESONIDE SCH MG: 0.5 SUSPENSION RESPIRATORY (INHALATION) at 06:33

## 2020-12-01 RX ADMIN — PROPOFOL SCH MLS/HR: 10 INJECTION, EMULSION INTRAVENOUS at 06:33

## 2020-12-01 RX ADMIN — PROPOFOL SCH MLS/HR: 10 INJECTION, EMULSION INTRAVENOUS at 03:08

## 2020-12-01 RX ADMIN — PROPOFOL SCH MLS/HR: 10 INJECTION, EMULSION INTRAVENOUS at 23:40

## 2020-12-01 RX ADMIN — HYDRALAZINE HYDROCHLORIDE PRN MG: 20 INJECTION INTRAMUSCULAR; INTRAVENOUS at 03:08

## 2020-12-01 RX ADMIN — PANTOPRAZOLE SODIUM SCH MG: 40 INJECTION, POWDER, FOR SOLUTION INTRAVENOUS at 09:53

## 2020-12-01 RX ADMIN — PROPOFOL SCH MLS/HR: 10 INJECTION, EMULSION INTRAVENOUS at 09:53

## 2020-12-01 RX ADMIN — MIDAZOLAM SCH MLS/HR: 5 INJECTION, SOLUTION INTRAMUSCULAR; INTRAVENOUS at 19:43

## 2020-12-01 RX ADMIN — ALBUTEROL SULFATE SCH MG: 2.5 SOLUTION RESPIRATORY (INHALATION) at 06:33

## 2020-12-01 RX ADMIN — ENOXAPARIN SODIUM SCH MG: 100 INJECTION SUBCUTANEOUS at 21:30

## 2020-12-01 RX ADMIN — ENOXAPARIN SODIUM SCH MG: 100 INJECTION SUBCUTANEOUS at 09:54

## 2020-12-01 RX ADMIN — PROPOFOL SCH MLS/HR: 10 INJECTION, EMULSION INTRAVENOUS at 16:13

## 2020-12-01 RX ADMIN — ALBUTEROL SULFATE SCH MG: 2.5 SOLUTION RESPIRATORY (INHALATION) at 14:38

## 2020-12-01 RX ADMIN — OXYCODONE HYDROCHLORIDE AND ACETAMINOPHEN SCH MG: 500 TABLET ORAL at 21:30

## 2020-12-01 RX ADMIN — MIDAZOLAM SCH MLS/HR: 5 INJECTION, SOLUTION INTRAMUSCULAR; INTRAVENOUS at 13:00

## 2020-12-01 RX ADMIN — MIDAZOLAM SCH MLS/HR: 5 INJECTION, SOLUTION INTRAMUSCULAR; INTRAVENOUS at 06:15

## 2020-12-01 RX ADMIN — MIDAZOLAM SCH MLS/HR: 5 INJECTION, SOLUTION INTRAMUSCULAR; INTRAVENOUS at 16:12

## 2020-12-01 NOTE — NUR
IV removal

IV on left hand DC'd with clean sterile technique, catheter fully intact. Pressure dressing 
applied to site. Patient tolerated well.



Patient only has left femoral triple lumen catheter.

## 2020-12-01 NOTE — NUR
SVT: 

Patient had a 22-sec run of SVT with rate in the 180s. Rhythm then converted back to ST 
100s-110s.

## 2020-12-01 NOTE — NUR
Respiratory note:

VENT CHECK DONE FROM PTS ROOM DOOR DUE TO COVID PRECAUTIONS. PTS CURRENT TEMP 100.4F. NO 
VENT CHANGES MADE, WILL CONTINUE TO MONITOR.

## 2020-12-01 NOTE — NUR
Fever of 100.6 despite previous Tylenol and cooling measures. Cooling blanket placed under 
patient and ice bags replaced. Will continue Tylenol Q6H.

## 2020-12-01 NOTE — NUR
Temp 100.4 Rectal:

Supp. Tylenol 650 mg given for rectal temp of 100.4 and cooling measures initiated.

## 2020-12-01 NOTE — NUR
Patient bathe/linen change

Patient given complete CHG bath. Skin integrity assessed for any changes. Optifoam CDI on 
sacrum. Partial linens and gown changed. Patient repositioned for comfort.

## 2020-12-02 VITALS — DIASTOLIC BLOOD PRESSURE: 93 MMHG | SYSTOLIC BLOOD PRESSURE: 128 MMHG

## 2020-12-02 VITALS — SYSTOLIC BLOOD PRESSURE: 111 MMHG | DIASTOLIC BLOOD PRESSURE: 81 MMHG

## 2020-12-02 VITALS — DIASTOLIC BLOOD PRESSURE: 105 MMHG | SYSTOLIC BLOOD PRESSURE: 139 MMHG

## 2020-12-02 VITALS — DIASTOLIC BLOOD PRESSURE: 88 MMHG | SYSTOLIC BLOOD PRESSURE: 129 MMHG

## 2020-12-02 VITALS — DIASTOLIC BLOOD PRESSURE: 101 MMHG | SYSTOLIC BLOOD PRESSURE: 143 MMHG

## 2020-12-02 VITALS — SYSTOLIC BLOOD PRESSURE: 110 MMHG | DIASTOLIC BLOOD PRESSURE: 82 MMHG

## 2020-12-02 VITALS — SYSTOLIC BLOOD PRESSURE: 113 MMHG | DIASTOLIC BLOOD PRESSURE: 79 MMHG

## 2020-12-02 VITALS — DIASTOLIC BLOOD PRESSURE: 72 MMHG | SYSTOLIC BLOOD PRESSURE: 99 MMHG

## 2020-12-02 VITALS — DIASTOLIC BLOOD PRESSURE: 86 MMHG | SYSTOLIC BLOOD PRESSURE: 113 MMHG

## 2020-12-02 VITALS — SYSTOLIC BLOOD PRESSURE: 121 MMHG | DIASTOLIC BLOOD PRESSURE: 95 MMHG

## 2020-12-02 VITALS — DIASTOLIC BLOOD PRESSURE: 108 MMHG | SYSTOLIC BLOOD PRESSURE: 153 MMHG

## 2020-12-02 VITALS — DIASTOLIC BLOOD PRESSURE: 81 MMHG | SYSTOLIC BLOOD PRESSURE: 109 MMHG

## 2020-12-02 VITALS — SYSTOLIC BLOOD PRESSURE: 131 MMHG | DIASTOLIC BLOOD PRESSURE: 92 MMHG

## 2020-12-02 VITALS — SYSTOLIC BLOOD PRESSURE: 130 MMHG | DIASTOLIC BLOOD PRESSURE: 97 MMHG

## 2020-12-02 VITALS — DIASTOLIC BLOOD PRESSURE: 70 MMHG | SYSTOLIC BLOOD PRESSURE: 101 MMHG

## 2020-12-02 VITALS — SYSTOLIC BLOOD PRESSURE: 133 MMHG | DIASTOLIC BLOOD PRESSURE: 99 MMHG

## 2020-12-02 VITALS — SYSTOLIC BLOOD PRESSURE: 122 MMHG | DIASTOLIC BLOOD PRESSURE: 92 MMHG

## 2020-12-02 VITALS — DIASTOLIC BLOOD PRESSURE: 112 MMHG | SYSTOLIC BLOOD PRESSURE: 142 MMHG

## 2020-12-02 VITALS — DIASTOLIC BLOOD PRESSURE: 99 MMHG | SYSTOLIC BLOOD PRESSURE: 141 MMHG

## 2020-12-02 VITALS — DIASTOLIC BLOOD PRESSURE: 103 MMHG | SYSTOLIC BLOOD PRESSURE: 123 MMHG

## 2020-12-02 VITALS — DIASTOLIC BLOOD PRESSURE: 80 MMHG | SYSTOLIC BLOOD PRESSURE: 113 MMHG

## 2020-12-02 VITALS — DIASTOLIC BLOOD PRESSURE: 103 MMHG | SYSTOLIC BLOOD PRESSURE: 142 MMHG

## 2020-12-02 VITALS — DIASTOLIC BLOOD PRESSURE: 83 MMHG | SYSTOLIC BLOOD PRESSURE: 116 MMHG

## 2020-12-02 VITALS — SYSTOLIC BLOOD PRESSURE: 136 MMHG | DIASTOLIC BLOOD PRESSURE: 101 MMHG

## 2020-12-02 VITALS — SYSTOLIC BLOOD PRESSURE: 124 MMHG | DIASTOLIC BLOOD PRESSURE: 91 MMHG

## 2020-12-02 VITALS — DIASTOLIC BLOOD PRESSURE: 79 MMHG | SYSTOLIC BLOOD PRESSURE: 136 MMHG

## 2020-12-02 VITALS — DIASTOLIC BLOOD PRESSURE: 104 MMHG | SYSTOLIC BLOOD PRESSURE: 133 MMHG

## 2020-12-02 VITALS — DIASTOLIC BLOOD PRESSURE: 81 MMHG | SYSTOLIC BLOOD PRESSURE: 114 MMHG

## 2020-12-02 VITALS — DIASTOLIC BLOOD PRESSURE: 78 MMHG | SYSTOLIC BLOOD PRESSURE: 106 MMHG

## 2020-12-02 VITALS — SYSTOLIC BLOOD PRESSURE: 103 MMHG | DIASTOLIC BLOOD PRESSURE: 74 MMHG

## 2020-12-02 VITALS — SYSTOLIC BLOOD PRESSURE: 135 MMHG | DIASTOLIC BLOOD PRESSURE: 98 MMHG

## 2020-12-02 VITALS — SYSTOLIC BLOOD PRESSURE: 107 MMHG | DIASTOLIC BLOOD PRESSURE: 80 MMHG

## 2020-12-02 VITALS — SYSTOLIC BLOOD PRESSURE: 112 MMHG | DIASTOLIC BLOOD PRESSURE: 73 MMHG

## 2020-12-02 VITALS — SYSTOLIC BLOOD PRESSURE: 141 MMHG | DIASTOLIC BLOOD PRESSURE: 110 MMHG

## 2020-12-02 VITALS — SYSTOLIC BLOOD PRESSURE: 140 MMHG | DIASTOLIC BLOOD PRESSURE: 106 MMHG

## 2020-12-02 VITALS — SYSTOLIC BLOOD PRESSURE: 127 MMHG | DIASTOLIC BLOOD PRESSURE: 101 MMHG

## 2020-12-02 VITALS — DIASTOLIC BLOOD PRESSURE: 97 MMHG | SYSTOLIC BLOOD PRESSURE: 125 MMHG

## 2020-12-02 VITALS — SYSTOLIC BLOOD PRESSURE: 148 MMHG | DIASTOLIC BLOOD PRESSURE: 108 MMHG

## 2020-12-02 VITALS — SYSTOLIC BLOOD PRESSURE: 136 MMHG | DIASTOLIC BLOOD PRESSURE: 100 MMHG

## 2020-12-02 VITALS — SYSTOLIC BLOOD PRESSURE: 140 MMHG | DIASTOLIC BLOOD PRESSURE: 108 MMHG

## 2020-12-02 VITALS — DIASTOLIC BLOOD PRESSURE: 80 MMHG | SYSTOLIC BLOOD PRESSURE: 108 MMHG

## 2020-12-02 VITALS — DIASTOLIC BLOOD PRESSURE: 104 MMHG | SYSTOLIC BLOOD PRESSURE: 136 MMHG

## 2020-12-02 VITALS — SYSTOLIC BLOOD PRESSURE: 109 MMHG | DIASTOLIC BLOOD PRESSURE: 80 MMHG

## 2020-12-02 VITALS — SYSTOLIC BLOOD PRESSURE: 132 MMHG | DIASTOLIC BLOOD PRESSURE: 95 MMHG

## 2020-12-02 VITALS — SYSTOLIC BLOOD PRESSURE: 106 MMHG | DIASTOLIC BLOOD PRESSURE: 76 MMHG

## 2020-12-02 VITALS — DIASTOLIC BLOOD PRESSURE: 108 MMHG | SYSTOLIC BLOOD PRESSURE: 136 MMHG

## 2020-12-02 VITALS — SYSTOLIC BLOOD PRESSURE: 122 MMHG | DIASTOLIC BLOOD PRESSURE: 93 MMHG

## 2020-12-02 VITALS — SYSTOLIC BLOOD PRESSURE: 134 MMHG | DIASTOLIC BLOOD PRESSURE: 103 MMHG

## 2020-12-02 VITALS — SYSTOLIC BLOOD PRESSURE: 110 MMHG | DIASTOLIC BLOOD PRESSURE: 81 MMHG

## 2020-12-02 VITALS — DIASTOLIC BLOOD PRESSURE: 79 MMHG | SYSTOLIC BLOOD PRESSURE: 111 MMHG

## 2020-12-02 VITALS — DIASTOLIC BLOOD PRESSURE: 100 MMHG | SYSTOLIC BLOOD PRESSURE: 128 MMHG

## 2020-12-02 VITALS — DIASTOLIC BLOOD PRESSURE: 82 MMHG | SYSTOLIC BLOOD PRESSURE: 111 MMHG

## 2020-12-02 VITALS — DIASTOLIC BLOOD PRESSURE: 100 MMHG | SYSTOLIC BLOOD PRESSURE: 139 MMHG

## 2020-12-02 VITALS — DIASTOLIC BLOOD PRESSURE: 97 MMHG | SYSTOLIC BLOOD PRESSURE: 132 MMHG

## 2020-12-02 VITALS — SYSTOLIC BLOOD PRESSURE: 131 MMHG | DIASTOLIC BLOOD PRESSURE: 95 MMHG

## 2020-12-02 VITALS — SYSTOLIC BLOOD PRESSURE: 110 MMHG | DIASTOLIC BLOOD PRESSURE: 77 MMHG

## 2020-12-02 VITALS — SYSTOLIC BLOOD PRESSURE: 108 MMHG | DIASTOLIC BLOOD PRESSURE: 75 MMHG

## 2020-12-02 VITALS — SYSTOLIC BLOOD PRESSURE: 102 MMHG | DIASTOLIC BLOOD PRESSURE: 72 MMHG

## 2020-12-02 VITALS — DIASTOLIC BLOOD PRESSURE: 106 MMHG | SYSTOLIC BLOOD PRESSURE: 130 MMHG

## 2020-12-02 VITALS — DIASTOLIC BLOOD PRESSURE: 94 MMHG | SYSTOLIC BLOOD PRESSURE: 138 MMHG

## 2020-12-02 VITALS — DIASTOLIC BLOOD PRESSURE: 102 MMHG | SYSTOLIC BLOOD PRESSURE: 137 MMHG

## 2020-12-02 VITALS — SYSTOLIC BLOOD PRESSURE: 123 MMHG | DIASTOLIC BLOOD PRESSURE: 89 MMHG

## 2020-12-02 VITALS — DIASTOLIC BLOOD PRESSURE: 82 MMHG | SYSTOLIC BLOOD PRESSURE: 107 MMHG

## 2020-12-02 VITALS — SYSTOLIC BLOOD PRESSURE: 106 MMHG | DIASTOLIC BLOOD PRESSURE: 75 MMHG

## 2020-12-02 VITALS — SYSTOLIC BLOOD PRESSURE: 108 MMHG | DIASTOLIC BLOOD PRESSURE: 79 MMHG

## 2020-12-02 VITALS — SYSTOLIC BLOOD PRESSURE: 129 MMHG | DIASTOLIC BLOOD PRESSURE: 93 MMHG

## 2020-12-02 VITALS — SYSTOLIC BLOOD PRESSURE: 135 MMHG | DIASTOLIC BLOOD PRESSURE: 105 MMHG

## 2020-12-02 VITALS — DIASTOLIC BLOOD PRESSURE: 101 MMHG | SYSTOLIC BLOOD PRESSURE: 127 MMHG

## 2020-12-02 VITALS — SYSTOLIC BLOOD PRESSURE: 125 MMHG | DIASTOLIC BLOOD PRESSURE: 95 MMHG

## 2020-12-02 VITALS — DIASTOLIC BLOOD PRESSURE: 81 MMHG | SYSTOLIC BLOOD PRESSURE: 112 MMHG

## 2020-12-02 VITALS — DIASTOLIC BLOOD PRESSURE: 104 MMHG | SYSTOLIC BLOOD PRESSURE: 139 MMHG

## 2020-12-02 VITALS — SYSTOLIC BLOOD PRESSURE: 109 MMHG | DIASTOLIC BLOOD PRESSURE: 85 MMHG

## 2020-12-02 VITALS — DIASTOLIC BLOOD PRESSURE: 83 MMHG | SYSTOLIC BLOOD PRESSURE: 110 MMHG

## 2020-12-02 VITALS — DIASTOLIC BLOOD PRESSURE: 101 MMHG | SYSTOLIC BLOOD PRESSURE: 139 MMHG

## 2020-12-02 VITALS — DIASTOLIC BLOOD PRESSURE: 80 MMHG | SYSTOLIC BLOOD PRESSURE: 107 MMHG

## 2020-12-02 VITALS — DIASTOLIC BLOOD PRESSURE: 99 MMHG | SYSTOLIC BLOOD PRESSURE: 139 MMHG

## 2020-12-02 VITALS — SYSTOLIC BLOOD PRESSURE: 128 MMHG | DIASTOLIC BLOOD PRESSURE: 97 MMHG

## 2020-12-02 VITALS — DIASTOLIC BLOOD PRESSURE: 103 MMHG | SYSTOLIC BLOOD PRESSURE: 132 MMHG

## 2020-12-02 VITALS — DIASTOLIC BLOOD PRESSURE: 94 MMHG | SYSTOLIC BLOOD PRESSURE: 131 MMHG

## 2020-12-02 VITALS — DIASTOLIC BLOOD PRESSURE: 103 MMHG | SYSTOLIC BLOOD PRESSURE: 139 MMHG

## 2020-12-02 VITALS — DIASTOLIC BLOOD PRESSURE: 96 MMHG | SYSTOLIC BLOOD PRESSURE: 149 MMHG

## 2020-12-02 VITALS — SYSTOLIC BLOOD PRESSURE: 139 MMHG | DIASTOLIC BLOOD PRESSURE: 100 MMHG

## 2020-12-02 VITALS — SYSTOLIC BLOOD PRESSURE: 132 MMHG | DIASTOLIC BLOOD PRESSURE: 105 MMHG

## 2020-12-02 VITALS — SYSTOLIC BLOOD PRESSURE: 134 MMHG | DIASTOLIC BLOOD PRESSURE: 101 MMHG

## 2020-12-02 VITALS — SYSTOLIC BLOOD PRESSURE: 115 MMHG | DIASTOLIC BLOOD PRESSURE: 83 MMHG

## 2020-12-02 VITALS — SYSTOLIC BLOOD PRESSURE: 134 MMHG | DIASTOLIC BLOOD PRESSURE: 107 MMHG

## 2020-12-02 LAB
ANION GAP SERPL CALCULATED.3IONS-SCNC: 5 MMOL/L (ref 5–15)
BUN SERPL-MCNC: 44 MG/DL (ref 7–18)
BUN/CREAT SERPL: 47.3
CALCIUM SERPL-MCNC: 8.4 MG/DL (ref 8.5–10.1)
CHLORIDE SERPL-SCNC: 107 MMOL/L (ref 98–107)
CO2 SERPL-SCNC: 30 MMOL/L (ref 21–32)
GLUCOSE SERPL-MCNC: 133 MG/DL (ref 74–106)
HCT VFR BLD AUTO: 51.8 % (ref 41–53)
HGB BLD-MCNC: 17.1 G/DL (ref 13.5–17.5)
MAGNESIUM SERPL-MCNC: 2.9 MG/DL (ref 1.6–2.6)
MCH RBC QN AUTO: 31.3 PG (ref 28–32)
MCV RBC AUTO: 94.6 FL (ref 80–100)
NRBC BLD QL AUTO: 0 %
POTASSIUM SERPL-SCNC: 3.9 MMOL/L (ref 3.5–5.1)
SODIUM SERPL-SCNC: 142 MMOL/L (ref 136–145)

## 2020-12-02 RX ADMIN — PANTOPRAZOLE SODIUM SCH MG: 40 INJECTION, POWDER, FOR SOLUTION INTRAVENOUS at 10:29

## 2020-12-02 RX ADMIN — ENOXAPARIN SODIUM SCH MG: 100 INJECTION SUBCUTANEOUS at 22:17

## 2020-12-02 RX ADMIN — Medication SCH UNIT: at 10:29

## 2020-12-02 RX ADMIN — BUDESONIDE SCH MG: 0.5 SUSPENSION RESPIRATORY (INHALATION) at 06:20

## 2020-12-02 RX ADMIN — MIDAZOLAM SCH MLS/HR: 5 INJECTION, SOLUTION INTRAMUSCULAR; INTRAVENOUS at 07:30

## 2020-12-02 RX ADMIN — ALBUTEROL SULFATE SCH MG: 2.5 SOLUTION RESPIRATORY (INHALATION) at 06:21

## 2020-12-02 RX ADMIN — PROPOFOL SCH MLS/HR: 10 INJECTION, EMULSION INTRAVENOUS at 18:50

## 2020-12-02 RX ADMIN — OXYCODONE HYDROCHLORIDE AND ACETAMINOPHEN SCH MG: 500 TABLET ORAL at 22:16

## 2020-12-02 RX ADMIN — MIDAZOLAM SCH MLS/HR: 5 INJECTION, SOLUTION INTRAMUSCULAR; INTRAVENOUS at 17:30

## 2020-12-02 RX ADMIN — OXYCODONE HYDROCHLORIDE AND ACETAMINOPHEN SCH MG: 500 TABLET ORAL at 10:29

## 2020-12-02 RX ADMIN — MIDAZOLAM SCH MLS/HR: 5 INJECTION, SOLUTION INTRAMUSCULAR; INTRAVENOUS at 02:30

## 2020-12-02 RX ADMIN — ENOXAPARIN SODIUM SCH MG: 100 INJECTION SUBCUTANEOUS at 10:29

## 2020-12-02 RX ADMIN — PROPOFOL SCH MLS/HR: 10 INJECTION, EMULSION INTRAVENOUS at 09:36

## 2020-12-02 RX ADMIN — MIDAZOLAM SCH MLS/HR: 5 INJECTION, SOLUTION INTRAMUSCULAR; INTRAVENOUS at 22:30

## 2020-12-02 RX ADMIN — PROPOFOL SCH MLS/HR: 10 INJECTION, EMULSION INTRAVENOUS at 04:30

## 2020-12-02 RX ADMIN — ALBUTEROL SULFATE SCH MG: 2.5 SOLUTION RESPIRATORY (INHALATION) at 14:47

## 2020-12-02 RX ADMIN — ALBUTEROL SULFATE SCH MG: 2.5 SOLUTION RESPIRATORY (INHALATION) at 22:01

## 2020-12-02 RX ADMIN — PROPOFOL SCH MLS/HR: 10 INJECTION, EMULSION INTRAVENOUS at 12:30

## 2020-12-02 RX ADMIN — ZINC SULFATE CAP 220 MG (50 MG ELEMENTAL ZN) SCH MG: 220 (50 ZN) CAP at 10:29

## 2020-12-02 RX ADMIN — BUDESONIDE SCH MG: 0.5 SUSPENSION RESPIRATORY (INHALATION) at 22:00

## 2020-12-02 RX ADMIN — MIDAZOLAM SCH MLS/HR: 5 INJECTION, SOLUTION INTRAMUSCULAR; INTRAVENOUS at 12:30

## 2020-12-02 NOTE — NUR
Resumed care at 0729, orders reviewed and ongoing assessments being done.  Remains intubated 
and sedated and positive for COVID-19.  In isolation per protocol.  Upon arrival gagging and 
strong productive cough with thick light yellow secretions from mouth and also down EET.  
Prior shift initiated weaning of sedation for possible CPAP trial today if appropriate.  Not 
opening eyes or following any direction at this time.

## 2020-12-02 NOTE — NUR
CARES



FULL BED LINEN CHANGE, CHG BED BATH, APPLIED OPTIFOAM TO SACRUM, PATIENT TOLERATED CARES 
WELL.

## 2020-12-02 NOTE — NUR
Wife Tha Boogie was a in patient in room 251 A.  She was discharged today and all her 
husbands belongings were taken to her prior to discharge.  Items  by Andree HENRIQUEZ.

## 2020-12-02 NOTE — NUR
In the room from 7684-5857. Weaned off Diprivan and on Precedex.  Not following any 
commands, with persistent gaging on ETT and coughing.  Unable to calm and not able to 
participate with weaning trial.  Continuing to suction large amount of thick yellow 
secretions.  Had to restart Diprivan.  



Rectal temperature spiked to 100.8.  Utilizing cooling blanket, ice packs and cool compress. 
Rectal temperature now 98.8.

## 2020-12-02 NOTE — NUR
Nutrition Followup Note



Pt wt 96.9kg



Pt is positive for COVID. Pt curtain was drawn when rounded this morning. Pt is intubated 
and sedated in ICU.  Pt is NPO with scheduled TF Jevity 1.2 @ 65 ml/hr, but not initiated 
yet.



Est energy needs 3172-0071 kcal (14-18 kcal/kg .4kg) 

Est protein needs 75-98g (1-1.3g/kg IBW 75kg) 

Will monitor and reassess prn. 



Labs:  BUN 44H, GLUC 133H, Alb 2.6L, Ca 8.4H



BM:  Pt with no BM noted per RN note 



Skin:  BS 10 high risk, full details in RN wound care note 

     

PES:  Obesity r/t caloric intake excess of needs aeb pt with a BMI of 35.9kg/m2

Inadequate oral intake r/t current medical condition aeb pt NPO, intubated and sedated



Comments:  1) Continue to monitor npo status, labs, skin 2) refer pt to OPD on DC 3)Continue 
current plan of care



Expected Outcomes/Goals: 1) Consider starting pt on alternate nutrition if pt is NPO >48hrs 
2) Consider Jevity 1.2 at a goal rate of 65 ml/hr 3) Consider TPN if TF is not medically 
feasible 4) advance diet as medically feasible 5) f/u 2-3 days

## 2020-12-03 VITALS — DIASTOLIC BLOOD PRESSURE: 96 MMHG | SYSTOLIC BLOOD PRESSURE: 137 MMHG

## 2020-12-03 VITALS — SYSTOLIC BLOOD PRESSURE: 155 MMHG | DIASTOLIC BLOOD PRESSURE: 110 MMHG

## 2020-12-03 VITALS — DIASTOLIC BLOOD PRESSURE: 80 MMHG | SYSTOLIC BLOOD PRESSURE: 107 MMHG

## 2020-12-03 VITALS — SYSTOLIC BLOOD PRESSURE: 151 MMHG | DIASTOLIC BLOOD PRESSURE: 97 MMHG

## 2020-12-03 VITALS — SYSTOLIC BLOOD PRESSURE: 113 MMHG | DIASTOLIC BLOOD PRESSURE: 83 MMHG

## 2020-12-03 VITALS — DIASTOLIC BLOOD PRESSURE: 114 MMHG | SYSTOLIC BLOOD PRESSURE: 134 MMHG

## 2020-12-03 VITALS — DIASTOLIC BLOOD PRESSURE: 99 MMHG | SYSTOLIC BLOOD PRESSURE: 141 MMHG

## 2020-12-03 VITALS — DIASTOLIC BLOOD PRESSURE: 138 MMHG | SYSTOLIC BLOOD PRESSURE: 168 MMHG

## 2020-12-03 VITALS — SYSTOLIC BLOOD PRESSURE: 136 MMHG | DIASTOLIC BLOOD PRESSURE: 117 MMHG

## 2020-12-03 VITALS — SYSTOLIC BLOOD PRESSURE: 143 MMHG | DIASTOLIC BLOOD PRESSURE: 111 MMHG

## 2020-12-03 VITALS — SYSTOLIC BLOOD PRESSURE: 188 MMHG | DIASTOLIC BLOOD PRESSURE: 124 MMHG

## 2020-12-03 VITALS — DIASTOLIC BLOOD PRESSURE: 88 MMHG | SYSTOLIC BLOOD PRESSURE: 121 MMHG

## 2020-12-03 VITALS — DIASTOLIC BLOOD PRESSURE: 106 MMHG | SYSTOLIC BLOOD PRESSURE: 154 MMHG

## 2020-12-03 VITALS — SYSTOLIC BLOOD PRESSURE: 107 MMHG | DIASTOLIC BLOOD PRESSURE: 77 MMHG

## 2020-12-03 VITALS — SYSTOLIC BLOOD PRESSURE: 152 MMHG | DIASTOLIC BLOOD PRESSURE: 88 MMHG

## 2020-12-03 VITALS — SYSTOLIC BLOOD PRESSURE: 109 MMHG | DIASTOLIC BLOOD PRESSURE: 81 MMHG

## 2020-12-03 VITALS — DIASTOLIC BLOOD PRESSURE: 81 MMHG | SYSTOLIC BLOOD PRESSURE: 110 MMHG

## 2020-12-03 VITALS — DIASTOLIC BLOOD PRESSURE: 80 MMHG | SYSTOLIC BLOOD PRESSURE: 112 MMHG

## 2020-12-03 VITALS — SYSTOLIC BLOOD PRESSURE: 114 MMHG | DIASTOLIC BLOOD PRESSURE: 83 MMHG

## 2020-12-03 VITALS — SYSTOLIC BLOOD PRESSURE: 107 MMHG | DIASTOLIC BLOOD PRESSURE: 80 MMHG

## 2020-12-03 VITALS — SYSTOLIC BLOOD PRESSURE: 110 MMHG | DIASTOLIC BLOOD PRESSURE: 81 MMHG

## 2020-12-03 VITALS — DIASTOLIC BLOOD PRESSURE: 80 MMHG | SYSTOLIC BLOOD PRESSURE: 110 MMHG

## 2020-12-03 VITALS — DIASTOLIC BLOOD PRESSURE: 81 MMHG | SYSTOLIC BLOOD PRESSURE: 112 MMHG

## 2020-12-03 VITALS — DIASTOLIC BLOOD PRESSURE: 103 MMHG | SYSTOLIC BLOOD PRESSURE: 150 MMHG

## 2020-12-03 VITALS — SYSTOLIC BLOOD PRESSURE: 155 MMHG | DIASTOLIC BLOOD PRESSURE: 99 MMHG

## 2020-12-03 VITALS — SYSTOLIC BLOOD PRESSURE: 148 MMHG | DIASTOLIC BLOOD PRESSURE: 106 MMHG

## 2020-12-03 VITALS — DIASTOLIC BLOOD PRESSURE: 82 MMHG | SYSTOLIC BLOOD PRESSURE: 110 MMHG

## 2020-12-03 VITALS — SYSTOLIC BLOOD PRESSURE: 122 MMHG | DIASTOLIC BLOOD PRESSURE: 92 MMHG

## 2020-12-03 VITALS — DIASTOLIC BLOOD PRESSURE: 79 MMHG | SYSTOLIC BLOOD PRESSURE: 109 MMHG

## 2020-12-03 VITALS — SYSTOLIC BLOOD PRESSURE: 105 MMHG | DIASTOLIC BLOOD PRESSURE: 76 MMHG

## 2020-12-03 VITALS — SYSTOLIC BLOOD PRESSURE: 122 MMHG | DIASTOLIC BLOOD PRESSURE: 87 MMHG

## 2020-12-03 VITALS — SYSTOLIC BLOOD PRESSURE: 147 MMHG | DIASTOLIC BLOOD PRESSURE: 109 MMHG

## 2020-12-03 VITALS — DIASTOLIC BLOOD PRESSURE: 76 MMHG | SYSTOLIC BLOOD PRESSURE: 105 MMHG

## 2020-12-03 VITALS — SYSTOLIC BLOOD PRESSURE: 163 MMHG | DIASTOLIC BLOOD PRESSURE: 123 MMHG

## 2020-12-03 VITALS — DIASTOLIC BLOOD PRESSURE: 84 MMHG | SYSTOLIC BLOOD PRESSURE: 110 MMHG

## 2020-12-03 VITALS — DIASTOLIC BLOOD PRESSURE: 117 MMHG | SYSTOLIC BLOOD PRESSURE: 167 MMHG

## 2020-12-03 VITALS — DIASTOLIC BLOOD PRESSURE: 76 MMHG | SYSTOLIC BLOOD PRESSURE: 108 MMHG

## 2020-12-03 VITALS — SYSTOLIC BLOOD PRESSURE: 128 MMHG | DIASTOLIC BLOOD PRESSURE: 92 MMHG

## 2020-12-03 VITALS — DIASTOLIC BLOOD PRESSURE: 85 MMHG | SYSTOLIC BLOOD PRESSURE: 112 MMHG

## 2020-12-03 VITALS — DIASTOLIC BLOOD PRESSURE: 82 MMHG | SYSTOLIC BLOOD PRESSURE: 111 MMHG

## 2020-12-03 VITALS — DIASTOLIC BLOOD PRESSURE: 93 MMHG | SYSTOLIC BLOOD PRESSURE: 138 MMHG

## 2020-12-03 VITALS — DIASTOLIC BLOOD PRESSURE: 129 MMHG | SYSTOLIC BLOOD PRESSURE: 158 MMHG

## 2020-12-03 VITALS — SYSTOLIC BLOOD PRESSURE: 157 MMHG | DIASTOLIC BLOOD PRESSURE: 119 MMHG

## 2020-12-03 VITALS — DIASTOLIC BLOOD PRESSURE: 101 MMHG | SYSTOLIC BLOOD PRESSURE: 153 MMHG

## 2020-12-03 VITALS — DIASTOLIC BLOOD PRESSURE: 117 MMHG | SYSTOLIC BLOOD PRESSURE: 165 MMHG

## 2020-12-03 VITALS — SYSTOLIC BLOOD PRESSURE: 108 MMHG | DIASTOLIC BLOOD PRESSURE: 80 MMHG

## 2020-12-03 VITALS — SYSTOLIC BLOOD PRESSURE: 143 MMHG | DIASTOLIC BLOOD PRESSURE: 93 MMHG

## 2020-12-03 VITALS — DIASTOLIC BLOOD PRESSURE: 120 MMHG | SYSTOLIC BLOOD PRESSURE: 160 MMHG

## 2020-12-03 VITALS — DIASTOLIC BLOOD PRESSURE: 99 MMHG | SYSTOLIC BLOOD PRESSURE: 139 MMHG

## 2020-12-03 VITALS — DIASTOLIC BLOOD PRESSURE: 105 MMHG | SYSTOLIC BLOOD PRESSURE: 136 MMHG

## 2020-12-03 VITALS — SYSTOLIC BLOOD PRESSURE: 113 MMHG | DIASTOLIC BLOOD PRESSURE: 85 MMHG

## 2020-12-03 VITALS — DIASTOLIC BLOOD PRESSURE: 80 MMHG | SYSTOLIC BLOOD PRESSURE: 120 MMHG

## 2020-12-03 VITALS — SYSTOLIC BLOOD PRESSURE: 129 MMHG | DIASTOLIC BLOOD PRESSURE: 100 MMHG

## 2020-12-03 VITALS — SYSTOLIC BLOOD PRESSURE: 115 MMHG | DIASTOLIC BLOOD PRESSURE: 78 MMHG

## 2020-12-03 VITALS — SYSTOLIC BLOOD PRESSURE: 148 MMHG | DIASTOLIC BLOOD PRESSURE: 101 MMHG

## 2020-12-03 VITALS — DIASTOLIC BLOOD PRESSURE: 104 MMHG | SYSTOLIC BLOOD PRESSURE: 156 MMHG

## 2020-12-03 VITALS — SYSTOLIC BLOOD PRESSURE: 180 MMHG | DIASTOLIC BLOOD PRESSURE: 132 MMHG

## 2020-12-03 VITALS — DIASTOLIC BLOOD PRESSURE: 107 MMHG | SYSTOLIC BLOOD PRESSURE: 146 MMHG

## 2020-12-03 VITALS — DIASTOLIC BLOOD PRESSURE: 100 MMHG | SYSTOLIC BLOOD PRESSURE: 150 MMHG

## 2020-12-03 VITALS — DIASTOLIC BLOOD PRESSURE: 118 MMHG | SYSTOLIC BLOOD PRESSURE: 150 MMHG

## 2020-12-03 VITALS — SYSTOLIC BLOOD PRESSURE: 160 MMHG | DIASTOLIC BLOOD PRESSURE: 102 MMHG

## 2020-12-03 VITALS — SYSTOLIC BLOOD PRESSURE: 105 MMHG | DIASTOLIC BLOOD PRESSURE: 79 MMHG

## 2020-12-03 VITALS — SYSTOLIC BLOOD PRESSURE: 150 MMHG | DIASTOLIC BLOOD PRESSURE: 103 MMHG

## 2020-12-03 VITALS — SYSTOLIC BLOOD PRESSURE: 107 MMHG | DIASTOLIC BLOOD PRESSURE: 78 MMHG

## 2020-12-03 VITALS — SYSTOLIC BLOOD PRESSURE: 113 MMHG | DIASTOLIC BLOOD PRESSURE: 81 MMHG

## 2020-12-03 VITALS — SYSTOLIC BLOOD PRESSURE: 120 MMHG | DIASTOLIC BLOOD PRESSURE: 90 MMHG

## 2020-12-03 VITALS — DIASTOLIC BLOOD PRESSURE: 108 MMHG | SYSTOLIC BLOOD PRESSURE: 141 MMHG

## 2020-12-03 VITALS — DIASTOLIC BLOOD PRESSURE: 78 MMHG | SYSTOLIC BLOOD PRESSURE: 109 MMHG

## 2020-12-03 LAB
ANION GAP SERPL CALCULATED.3IONS-SCNC: 7 MMOL/L (ref 5–15)
BUN SERPL-MCNC: 52 MG/DL (ref 7–18)
BUN/CREAT SERPL: 55.3
CALCIUM SERPL-MCNC: 8.6 MG/DL (ref 8.5–10.1)
CHLORIDE SERPL-SCNC: 108 MMOL/L (ref 98–107)
CO2 SERPL-SCNC: 29 MMOL/L (ref 21–32)
GLUCOSE SERPL-MCNC: 148 MG/DL (ref 74–106)
HCT VFR BLD AUTO: 50.8 % (ref 41–53)
HGB BLD-MCNC: 16.6 G/DL (ref 13.5–17.5)
MAGNESIUM SERPL-MCNC: 3.1 MG/DL (ref 1.6–2.6)
MCH RBC QN AUTO: 31.2 PG (ref 28–32)
MCV RBC AUTO: 95.2 FL (ref 80–100)
NRBC BLD QL AUTO: 0 %
POTASSIUM SERPL-SCNC: 3.8 MMOL/L (ref 3.5–5.1)
SODIUM SERPL-SCNC: 144 MMOL/L (ref 136–145)

## 2020-12-03 RX ADMIN — MIDAZOLAM SCH MLS/HR: 5 INJECTION, SOLUTION INTRAMUSCULAR; INTRAVENOUS at 08:30

## 2020-12-03 RX ADMIN — PANTOPRAZOLE SODIUM SCH MG: 40 INJECTION, POWDER, FOR SOLUTION INTRAVENOUS at 10:15

## 2020-12-03 RX ADMIN — METOPROLOL TARTRATE SCH MG: 50 TABLET, FILM COATED ORAL at 20:19

## 2020-12-03 RX ADMIN — PROPOFOL SCH MLS/HR: 10 INJECTION, EMULSION INTRAVENOUS at 05:12

## 2020-12-03 RX ADMIN — PROPOFOL SCH MLS/HR: 10 INJECTION, EMULSION INTRAVENOUS at 03:43

## 2020-12-03 RX ADMIN — BUDESONIDE SCH MG: 0.5 SUSPENSION RESPIRATORY (INHALATION) at 13:11

## 2020-12-03 RX ADMIN — PROPOFOL SCH MLS/HR: 10 INJECTION, EMULSION INTRAVENOUS at 22:39

## 2020-12-03 RX ADMIN — HYDROCODONE BITARTRATE AND ACETAMINOPHEN PRN TAB: 5; 325 TABLET ORAL at 20:21

## 2020-12-03 RX ADMIN — ZINC SULFATE CAP 220 MG (50 MG ELEMENTAL ZN) SCH MG: 220 (50 ZN) CAP at 10:15

## 2020-12-03 RX ADMIN — OXYCODONE HYDROCHLORIDE AND ACETAMINOPHEN SCH MG: 500 TABLET ORAL at 20:20

## 2020-12-03 RX ADMIN — PROPOFOL SCH MLS/HR: 10 INJECTION, EMULSION INTRAVENOUS at 15:00

## 2020-12-03 RX ADMIN — MIDAZOLAM SCH MLS/HR: 5 INJECTION, SOLUTION INTRAMUSCULAR; INTRAVENOUS at 13:30

## 2020-12-03 RX ADMIN — MIDAZOLAM SCH MLS/HR: 5 INJECTION, SOLUTION INTRAMUSCULAR; INTRAVENOUS at 18:30

## 2020-12-03 RX ADMIN — HYDRALAZINE HYDROCHLORIDE PRN MG: 20 INJECTION INTRAMUSCULAR; INTRAVENOUS at 22:48

## 2020-12-03 RX ADMIN — MIDAZOLAM SCH MLS/HR: 5 INJECTION, SOLUTION INTRAMUSCULAR; INTRAVENOUS at 03:30

## 2020-12-03 RX ADMIN — OXYCODONE HYDROCHLORIDE AND ACETAMINOPHEN SCH MG: 500 TABLET ORAL at 10:15

## 2020-12-03 RX ADMIN — MIDAZOLAM SCH MLS/HR: 5 INJECTION, SOLUTION INTRAMUSCULAR; INTRAVENOUS at 20:22

## 2020-12-03 RX ADMIN — MIDAZOLAM SCH MLS/HR: 5 INJECTION, SOLUTION INTRAMUSCULAR; INTRAVENOUS at 22:39

## 2020-12-03 RX ADMIN — Medication SCH UNIT: at 10:15

## 2020-12-03 RX ADMIN — ALBUTEROL SULFATE SCH MG: 2.5 SOLUTION RESPIRATORY (INHALATION) at 07:26

## 2020-12-03 RX ADMIN — PROPOFOL SCH MLS/HR: 10 INJECTION, EMULSION INTRAVENOUS at 19:54

## 2020-12-03 RX ADMIN — PROPOFOL SCH MLS/HR: 10 INJECTION, EMULSION INTRAVENOUS at 10:06

## 2020-12-03 RX ADMIN — ENOXAPARIN SODIUM SCH MG: 100 INJECTION SUBCUTANEOUS at 20:20

## 2020-12-03 RX ADMIN — ENOXAPARIN SODIUM SCH MG: 100 INJECTION SUBCUTANEOUS at 10:15

## 2020-12-03 RX ADMIN — ALBUTEROL SULFATE SCH MG: 2.5 SOLUTION RESPIRATORY (INHALATION) at 13:11

## 2020-12-03 NOTE — NUR
In room 1805 to 1830.  Assisted with repositioning and fed him a jello and one juice box.  
Able to swallow scheduled Wellbutrin pill without difficulty.



Continue current plan of care.

## 2020-12-03 NOTE — NUR
PATIENT REQUESTING TO TALK TO HIS WIFE



CALLED HIS WIFE [place] TWO TIMES, NO ANSWER.



PATIENT STATES, THAT HE WANTS TO GO HOME TOMORROW AND THAT HE MISSES HIS WIFE. PROVIDED 
THERAPEUTIC LISTENING.

EDUCATED ON HIS STATUS AND POC.

## 2020-12-03 NOTE — NUR
Resumed care at 0715, orders reviewed and ongoing assessments being done.  In isolation and 
being treated for Covid-19.  Upon arrival, intubated and sedated.  Plan for the day, CPAP 
trial.  Kizzy DELGADO initiated CPAP at 0720.  Diprivan and Precedex initially still infusing.  
Diprivan was weaned off at 0900 and Precedex off at 10 am.  First part of the morning very 
sluggish but able to follow simple direction, not able to keep eyes open. 



Remains sleepy but continues to follow simple directions.  CPAP trial continued.  Kizzy DELGADO 
obtained all parameters and ABG done and results were forward to Dr. Kevin.  Per Dr. Kevin, 
okay to extubate.  



Procedure explained and reoriented to place, time and situation.  Requires frequent 
reassurance.  Kizzy DELGADO extubated at 1027.  Oral care done and placed on BIPAP per Dr. Kevin.  Initially Fio2 100%, decreased to 40% at 11am.  



Awakening more and starting to move about in bed.

## 2020-12-03 NOTE — NUR
PROGRESS NOTE



PATIENT STILL REMAINS ON COOLING/WARMING BLANKET, TEMPERATURE AUTO REGULATED.



PATIENT IS TRYING TO OPEN HIS EYES, EYES LIDS ARE HEAVY, ABLE TO FOLLOW COMMANDS, SQUEEZES 
WITH BOTH HANDS, LEFT HAND NO WEAKNESS, RIGHT HAND MODERATE WEAKNESS. MINIMUM MOVEMENT WITH 
RIGHT LEG, DID NOT NOTE ANY MOVEMENT ON HIS LEFT LEG.

ABLE TO NOD HIS HEAD YES.



PATIENT WAS ORIENTED TO SURROUNDING, SITUATION AND POC, PATIENT REMAIN CALM AT THIS MOMENT.

## 2020-12-03 NOTE — NUR
PATIENT WAS ASKING WHERE HIS RINGS WENT



EDUCATED PATIENT THAT ALL OF HIS BELONGINGS WERE SENT WITH HIS WIFE HOME.

## 2020-12-03 NOTE — NUR
PATIENT REQUESTING BED PAN



PLACED ON BED PAN, PATIENT DID NOT HAVE BOWEL MOVEMENT AFTER 14 MIN, TOOK HIM OFF BED PAN.

TOLERATED TURNS WELL.



PATIENT STATED HE WILL TRY AGAIN LATER.

## 2020-12-03 NOTE — NUR
PT ASSESSED FOR PRN MED NEB TX. SPO2 96% ON 8L OXYMIZER, , RR 21. NO RESPIRATORY 
DISTRESS NOTED, WILL CONTINUE TO MONITOR.

## 2020-12-03 NOTE — NUR
Dr. Candelario in at 1140 and rounded.  Discussed condition and plan of care.  Reviewed home 
medications.  Made him aware of elevation in BP since sedation weaned off and being more 
alert.  Orders obtained for HTN medications.



Is full awake now and moving all extremities.  To weak to attempt to climb out of bed, but 
moving hips side to side.  Starting to shout and saying "let me out" "help".  In room from 
1600 to 1640.  Reoriented place, time and situation.  Continues to follow simple direction 
but very anxious, disoriented and restless.  Reassurance given.  "I just want to go home" he 
stated.  Assisted with repositioning, oral care done.   Medicated with xanax 0.5mg via NGT.

## 2020-12-04 VITALS — DIASTOLIC BLOOD PRESSURE: 104 MMHG | SYSTOLIC BLOOD PRESSURE: 173 MMHG

## 2020-12-04 VITALS — DIASTOLIC BLOOD PRESSURE: 100 MMHG | SYSTOLIC BLOOD PRESSURE: 141 MMHG

## 2020-12-04 VITALS — DIASTOLIC BLOOD PRESSURE: 107 MMHG | SYSTOLIC BLOOD PRESSURE: 148 MMHG

## 2020-12-04 VITALS — DIASTOLIC BLOOD PRESSURE: 100 MMHG | SYSTOLIC BLOOD PRESSURE: 148 MMHG

## 2020-12-04 VITALS — SYSTOLIC BLOOD PRESSURE: 140 MMHG | DIASTOLIC BLOOD PRESSURE: 99 MMHG

## 2020-12-04 VITALS — DIASTOLIC BLOOD PRESSURE: 96 MMHG | SYSTOLIC BLOOD PRESSURE: 132 MMHG

## 2020-12-04 VITALS — DIASTOLIC BLOOD PRESSURE: 92 MMHG | SYSTOLIC BLOOD PRESSURE: 130 MMHG

## 2020-12-04 VITALS — DIASTOLIC BLOOD PRESSURE: 116 MMHG | SYSTOLIC BLOOD PRESSURE: 153 MMHG

## 2020-12-04 VITALS — DIASTOLIC BLOOD PRESSURE: 104 MMHG | SYSTOLIC BLOOD PRESSURE: 141 MMHG

## 2020-12-04 VITALS — SYSTOLIC BLOOD PRESSURE: 137 MMHG | DIASTOLIC BLOOD PRESSURE: 104 MMHG

## 2020-12-04 VITALS — SYSTOLIC BLOOD PRESSURE: 166 MMHG | DIASTOLIC BLOOD PRESSURE: 110 MMHG

## 2020-12-04 VITALS — SYSTOLIC BLOOD PRESSURE: 152 MMHG | DIASTOLIC BLOOD PRESSURE: 102 MMHG

## 2020-12-04 VITALS — SYSTOLIC BLOOD PRESSURE: 153 MMHG | DIASTOLIC BLOOD PRESSURE: 110 MMHG

## 2020-12-04 VITALS — SYSTOLIC BLOOD PRESSURE: 159 MMHG | DIASTOLIC BLOOD PRESSURE: 132 MMHG

## 2020-12-04 VITALS — SYSTOLIC BLOOD PRESSURE: 127 MMHG | DIASTOLIC BLOOD PRESSURE: 87 MMHG

## 2020-12-04 VITALS — SYSTOLIC BLOOD PRESSURE: 146 MMHG | DIASTOLIC BLOOD PRESSURE: 105 MMHG

## 2020-12-04 VITALS — SYSTOLIC BLOOD PRESSURE: 163 MMHG | DIASTOLIC BLOOD PRESSURE: 112 MMHG

## 2020-12-04 VITALS — DIASTOLIC BLOOD PRESSURE: 96 MMHG | SYSTOLIC BLOOD PRESSURE: 147 MMHG

## 2020-12-04 VITALS — SYSTOLIC BLOOD PRESSURE: 158 MMHG | DIASTOLIC BLOOD PRESSURE: 113 MMHG

## 2020-12-04 VITALS — SYSTOLIC BLOOD PRESSURE: 154 MMHG | DIASTOLIC BLOOD PRESSURE: 95 MMHG

## 2020-12-04 VITALS — DIASTOLIC BLOOD PRESSURE: 105 MMHG | SYSTOLIC BLOOD PRESSURE: 150 MMHG

## 2020-12-04 VITALS — SYSTOLIC BLOOD PRESSURE: 143 MMHG | DIASTOLIC BLOOD PRESSURE: 108 MMHG

## 2020-12-04 VITALS — DIASTOLIC BLOOD PRESSURE: 90 MMHG | SYSTOLIC BLOOD PRESSURE: 132 MMHG

## 2020-12-04 VITALS — DIASTOLIC BLOOD PRESSURE: 96 MMHG | SYSTOLIC BLOOD PRESSURE: 149 MMHG

## 2020-12-04 VITALS — SYSTOLIC BLOOD PRESSURE: 137 MMHG | DIASTOLIC BLOOD PRESSURE: 101 MMHG

## 2020-12-04 VITALS — SYSTOLIC BLOOD PRESSURE: 139 MMHG | DIASTOLIC BLOOD PRESSURE: 108 MMHG

## 2020-12-04 VITALS — SYSTOLIC BLOOD PRESSURE: 124 MMHG | DIASTOLIC BLOOD PRESSURE: 90 MMHG

## 2020-12-04 VITALS — SYSTOLIC BLOOD PRESSURE: 145 MMHG | DIASTOLIC BLOOD PRESSURE: 108 MMHG

## 2020-12-04 VITALS — DIASTOLIC BLOOD PRESSURE: 103 MMHG | SYSTOLIC BLOOD PRESSURE: 135 MMHG

## 2020-12-04 VITALS — SYSTOLIC BLOOD PRESSURE: 141 MMHG | DIASTOLIC BLOOD PRESSURE: 105 MMHG

## 2020-12-04 VITALS — SYSTOLIC BLOOD PRESSURE: 145 MMHG | DIASTOLIC BLOOD PRESSURE: 110 MMHG

## 2020-12-04 VITALS — SYSTOLIC BLOOD PRESSURE: 153 MMHG | DIASTOLIC BLOOD PRESSURE: 111 MMHG

## 2020-12-04 VITALS — SYSTOLIC BLOOD PRESSURE: 148 MMHG | DIASTOLIC BLOOD PRESSURE: 121 MMHG

## 2020-12-04 LAB
ANION GAP SERPL CALCULATED.3IONS-SCNC: 12 MMOL/L (ref 5–15)
APTT PPP: 27.9 SEC (ref 23–31.2)
BUN SERPL-MCNC: 36 MG/DL (ref 7–18)
BUN/CREAT SERPL: 40.9
CALCIUM SERPL-MCNC: 8.8 MG/DL (ref 8.5–10.1)
CHLORIDE SERPL-SCNC: 106 MMOL/L (ref 98–107)
CO2 SERPL-SCNC: 22 MMOL/L (ref 21–32)
GLUCOSE SERPL-MCNC: 170 MG/DL (ref 74–106)
HCT VFR BLD AUTO: 52.2 % (ref 41–53)
HGB BLD-MCNC: 16.8 G/DL (ref 13.5–17.5)
INR PPP: 1.11 (ref 0.9–1.15)
MCH RBC QN AUTO: 30.8 PG (ref 28–32)
MCV RBC AUTO: 95.6 FL (ref 80–100)
NRBC BLD QL AUTO: 0.1 %
POTASSIUM SERPL-SCNC: 3.3 MMOL/L (ref 3.5–5.1)
SODIUM SERPL-SCNC: 140 MMOL/L (ref 136–145)

## 2020-12-04 RX ADMIN — POTASSIUM CHLORIDE SCH MLS/HR: 200 INJECTION, SOLUTION INTRAVENOUS at 11:28

## 2020-12-04 RX ADMIN — METOPROLOL TARTRATE SCH MG: 50 TABLET, FILM COATED ORAL at 21:17

## 2020-12-04 RX ADMIN — POTASSIUM CHLORIDE SCH MLS/HR: 200 INJECTION, SOLUTION INTRAVENOUS at 06:45

## 2020-12-04 RX ADMIN — MIDAZOLAM SCH MLS/HR: 5 INJECTION, SOLUTION INTRAMUSCULAR; INTRAVENOUS at 09:30

## 2020-12-04 RX ADMIN — MIDAZOLAM SCH MLS/HR: 5 INJECTION, SOLUTION INTRAMUSCULAR; INTRAVENOUS at 15:51

## 2020-12-04 RX ADMIN — OXYCODONE HYDROCHLORIDE AND ACETAMINOPHEN SCH MG: 500 TABLET ORAL at 21:18

## 2020-12-04 RX ADMIN — HALOPERIDOL SCH MG: 1 TABLET ORAL at 21:17

## 2020-12-04 RX ADMIN — PROPOFOL SCH MLS/HR: 10 INJECTION, EMULSION INTRAVENOUS at 15:30

## 2020-12-04 RX ADMIN — ENOXAPARIN SODIUM SCH MG: 100 INJECTION SUBCUTANEOUS at 21:18

## 2020-12-04 RX ADMIN — DOCUSATE SODIUM LIQUID SCH MG: 100 LIQUID ORAL at 10:00

## 2020-12-04 RX ADMIN — PROPOFOL SCH MLS/HR: 10 INJECTION, EMULSION INTRAVENOUS at 05:42

## 2020-12-04 RX ADMIN — DOCUSATE SODIUM LIQUID SCH MG: 100 LIQUID ORAL at 21:16

## 2020-12-04 RX ADMIN — METOPROLOL TARTRATE SCH MG: 50 TABLET, FILM COATED ORAL at 11:32

## 2020-12-04 RX ADMIN — PROPOFOL SCH MLS/HR: 10 INJECTION, EMULSION INTRAVENOUS at 10:36

## 2020-12-04 RX ADMIN — HYDRALAZINE HYDROCHLORIDE PRN MG: 20 INJECTION INTRAMUSCULAR; INTRAVENOUS at 15:00

## 2020-12-04 RX ADMIN — Medication SCH UNIT: at 11:30

## 2020-12-04 RX ADMIN — PANTOPRAZOLE SODIUM SCH MG: 40 INJECTION, POWDER, FOR SOLUTION INTRAVENOUS at 11:29

## 2020-12-04 RX ADMIN — MIDAZOLAM SCH MLS/HR: 5 INJECTION, SOLUTION INTRAMUSCULAR; INTRAVENOUS at 14:30

## 2020-12-04 RX ADMIN — OXYCODONE HYDROCHLORIDE AND ACETAMINOPHEN SCH MG: 500 TABLET ORAL at 11:29

## 2020-12-04 RX ADMIN — ENOXAPARIN SODIUM SCH MG: 100 INJECTION SUBCUTANEOUS at 11:30

## 2020-12-04 NOTE — NUR
PT RECEIVED ON 10 L VIA OXYMIZER AND IS CURRENTLY STABLE, NO SOB OR RESPIRATORY DISTRESS 
NOTED. SPO2 93%, RR 22 AND . NO INDICATION FOR PRN TX NOTED AT THIS TIME. WILL 
CONTINUE TO MONITOR.

## 2020-12-04 NOTE — NUR
NUTRITION

DIET ADVANCED AS PATIENT IS ABLE TO SWALLOW CLEAR LIQUIDS AND WHOLE PILLS WELL. CARDIAC DIET 
PROVIDED. MAX ASSISTANCE PROVIDED DUE TO BILATERAL UPPER EXT. WEAKNESS.  TOTAL OF 75% INTAKE 
TOLERATED.

## 2020-12-04 NOTE — NUR
Physical therapy

Joey at bedside to perform evaluation. Patient sitting up in chair. See PT notes. Pox 88-90% 
with 6L Oxymizer.

-------------------------------------------------------------------------------

Addendum: 12/04/20 at 1841 by Marcy Suresh RN

-------------------------------------------------------------------------------

POX DECREASED TO 83-84%. OXYMIZER INCREASED TO 10L/MIN.

## 2020-12-04 NOTE — NUR
Mooney catheter dc'd

Order to discontinue mooney catheter.  Mooney dc'd  with clean technique following deflation 
of balloon. Patient tolerated well with no complaints of pain. Continue care.

## 2020-12-04 NOTE — NUR
ANXIETY/ AGGRESSIVE 

 DURING REPORT PATIENT YELLING OUT AND ATTEMPTING TO GET OUT OF BED BUT UNSUCCESSFUL DUE TO 
EXTREME WEAKNESS. ATTEMPTING CALMING TECHNIQUES, PATIENT THEN BEGAN TO CURSE AT NURSES WHILE 
ATTEMPTING TO HELP. PATIENT ALERT AND ORIENTED X3. PATIENT ANXIOUS AND DEMANDING TO GO HOME. 
 RE- ORIENTED PATIENT. EXPLAINED TO PATIENT NEED FOR MD EVALUATION. PATIENT AGREES TO WAIT. 
ALL COMFORT MEASURES PROVIDED TO ALLEVIATE FRUSTRATION.

## 2020-12-04 NOTE — NUR
OPENING NOTE

REPORT RECEIVED FROM EUNICE HENRIQUEZ. THIS IS A COVID POSITIVE PATIENT ON RESPIRATORY ISOLATION.

PATIENT IS AWAKE, A/OX4, YELLING OUT AND RESTLESS. PATIENT CONNECTED TO CONTINUOUS BEDSIDE 
MONITORS. SINUS TACHY ON THE MONITOR. 10L OXYMIZER IN PLACE SATS >95% WHEN PATIENT IS NOT 
YELLING OUT. GIVENS WAS TAKEN OUT TODAY, PATIENT IS ABLE TO VOID IN THE URINAL. NOTED CLOUDY 
DARK SUGEY URINE IN URINAL. SMALL MEDIAL SACRAL SKIN TEAR. Z GUARD AND OPTIFOAM TO SACRUM. 
RIGHT TRIPLE LUMEN IJ IN PLACE, SALINE LOCKED. PENDING BED IN TELE UNIT, PATIENT AWARE OF 
THAT.

## 2020-12-04 NOTE — NUR
LOW SATS

PATIENT YELLING AT THE TV, SPO2 IN HIGH 80'S. INCREASED OXYMIZER TO 12L. WILL CONTINUE TO 
MONITOR.

## 2020-12-04 NOTE — NUR
Patient again having another episode of agitation, attempting to get out of bed, yelling out 
he wants mooney catheter out. Patient reoriented. Deescalated situation. Patient again 
reoriented. Patient alert to situation and has agreed to wait for md to round. Family called 
and aware to bring patients cellphone as requesting.  Patient able to  swallow pills without 
difficulty.  Will continue to monitor.

## 2020-12-04 NOTE — NUR
Incentive spirometer

 Patient instructed on how to properly use I.S. Patient able to reach 2500ml during 
inspiration.  Will continue to encourage.

## 2020-12-04 NOTE — NUR
Nutrition Followup Note



Pt wt 96.6kg



Pt is positive for COVID. Pt was awake with RN attending when rounded this morning. Pt is 
with an oxymizer and per RN is combative.  Pt is with a Clear Liquid diet, appetite is good 
aeb 100% PO intake per RN doc.  



Est energy needs 2638-5403 kcal (14-18 kcal/kg .4kg) 

Est protein needs 75-98g (1-1.3g/kg IBW 75kg) 

Will monitor and reassess prn. 



Labs:  BUN 36H, GLUC 170H, Alb 2.6L



BM:  Pt with 1 BM today per RN note 



Skin:  BS 13 mod risk, full details in RN wound care note 

     

PES:  

1) Obesity r/t caloric intake excess of needs aeb pt with a BMI of 35.9kg/m2

2) Inadequate oral intake r/t current medical condition aeb pt NPO, intubated and sedated



Comments: 

 Will continue to monitor PO status, skin status, pertinent labs and weight trends. Will f/u 
in 2-3 days

1) Consider starting pt on alternate nutrition if pt is NPO >48hrs (N/A)

2) Consider Jevity 1.2 at a goal rate of 65 ml/hr (N/A)

3) Consider TPN if TF is not medically feasible (N/A)

4) Advance diet as medically feasible ( In process)

5) Refer pt to OPD on DC 

6) Continue current plan of care



Expected Outcomes/Goals: 1)

## 2020-12-04 NOTE — NUR
Family updated on pt status

Family of TEE BRUNER updated on patient's status and condition. All questions and concerns 
addressed. Father verbalized understanding.

## 2020-12-04 NOTE — NUR
REPORT

PATIENT RESTING COMFORTABLY IN BED. HR  POX 98% ON 10/ OXYMIZER. RR 25. NOC SHIFT TO 
RESUME CARE. DINNER TRAY PENDING.

## 2020-12-04 NOTE — NUR
Activity

Patient assisted back to bed by physical therapy as patient was demanding he wanted to get 
back. Patient almost became too impatient and tried to get into bed independently. Patient 
needing frequent reinforcement to wait for P.T.

## 2020-12-04 NOTE — NUR
Respiratory note:

PT ASSESSED FROM OUTSIDE OF ROOM DUE TO PT BEING EXTREMELY AGITATED, AND AGGRESSIVE.  RN AT 
BEDSIDE BS ARE CLEAR/DIMINISHED BILATERALLY.  RN TITRATED FIO2 TO 4L OXYMIZER, FROM A 
6LOXYMIZER.  PT TOLERATED CHANGE WELL.  SPO2 95%, , RR 24, /100.  PRN MEDNEB TX 
NOT INDICATED AT THIS TIME.  WILL CONTINUE TO MONITOR PT.  CHARTING COMPLETE FROM OUTSIDE OF 
PT ROOM PER COVID-19 PRECAUTIONS/PROTOCOL.

## 2020-12-04 NOTE — NUR
Assessment 



Regarding social service consult for SNF vs home health. Per GREGORY Jaeger Patient was extubated on 
12/3/20 and is not stabilize to discharge at this time. Will follow up on Monday.

## 2020-12-04 NOTE — NUR
WOUND CARE NOTE:

Wound care in to see patient for  skin integrity monitoring.  Patient extubated yesterday. 
Patient continue resting in ICU low air loss bed in Rm. 102. Patient remain on airborne 
precautions due to CoVid. Patient is awake,alert and able to verbalize needs and follow 
direction. Patient is in no stated pain at this time. He's able to assist in turning and 
repositioning and his Tera score is 13.



Skin assessment done with the assistance of patient's nurse, FLORENCE Vidal. Patient's distal 
medial sacrum at intragluteal fold noted with  2x1cm open partial thickness skin tear 
appears to be moisture related, amaris wound is pink/red blanchable. FLORENCE Willams took picture 
of patient's wound for reference. Cleansed sacral wound with mild soap and water,patted 
dry,applied Z Guard cream and covered wound with Opti foam gentle dressing. Patient 
tolerated well. Repositioned for comfort facing his Lt side,redistributed pressure points 
with pillows. FLORENCE Vidal at bedside.



RECOMMENDATION: Continuation of all wound care orders MD prescribed, continue with 
skin/wound plan of care,continue monitoring by wound care while Tera score is <18.

-------------------------------------------------------------------------------

Addendum: 12/04/20 at 1851 by Maral Chan RN

-------------------------------------------------------------------------------

Amended: Links added.

## 2020-12-04 NOTE — NUR
BOWEL MOVEMENT



PATIENT HAD LIQUID BROWN BOWEL MOVEMENT.

PATIENT RECEIVED PARTIAL BED BATH AND FULL BED LINEN CHANGE.

PATIENT TOLERATED BED LINEN CHANGE WELL.

## 2020-12-05 VITALS — DIASTOLIC BLOOD PRESSURE: 75 MMHG | SYSTOLIC BLOOD PRESSURE: 123 MMHG

## 2020-12-05 VITALS — DIASTOLIC BLOOD PRESSURE: 58 MMHG | SYSTOLIC BLOOD PRESSURE: 98 MMHG

## 2020-12-05 VITALS — DIASTOLIC BLOOD PRESSURE: 93 MMHG | SYSTOLIC BLOOD PRESSURE: 149 MMHG

## 2020-12-05 VITALS — SYSTOLIC BLOOD PRESSURE: 155 MMHG | DIASTOLIC BLOOD PRESSURE: 102 MMHG

## 2020-12-05 VITALS — DIASTOLIC BLOOD PRESSURE: 93 MMHG | SYSTOLIC BLOOD PRESSURE: 152 MMHG

## 2020-12-05 VITALS — SYSTOLIC BLOOD PRESSURE: 121 MMHG | DIASTOLIC BLOOD PRESSURE: 70 MMHG

## 2020-12-05 VITALS — DIASTOLIC BLOOD PRESSURE: 101 MMHG | SYSTOLIC BLOOD PRESSURE: 143 MMHG

## 2020-12-05 VITALS — DIASTOLIC BLOOD PRESSURE: 72 MMHG | SYSTOLIC BLOOD PRESSURE: 110 MMHG

## 2020-12-05 VITALS — DIASTOLIC BLOOD PRESSURE: 87 MMHG | SYSTOLIC BLOOD PRESSURE: 128 MMHG

## 2020-12-05 VITALS — DIASTOLIC BLOOD PRESSURE: 72 MMHG | SYSTOLIC BLOOD PRESSURE: 113 MMHG

## 2020-12-05 VITALS — SYSTOLIC BLOOD PRESSURE: 170 MMHG | DIASTOLIC BLOOD PRESSURE: 101 MMHG

## 2020-12-05 VITALS — DIASTOLIC BLOOD PRESSURE: 104 MMHG | SYSTOLIC BLOOD PRESSURE: 174 MMHG

## 2020-12-05 VITALS — SYSTOLIC BLOOD PRESSURE: 150 MMHG | DIASTOLIC BLOOD PRESSURE: 101 MMHG

## 2020-12-05 VITALS — SYSTOLIC BLOOD PRESSURE: 144 MMHG | DIASTOLIC BLOOD PRESSURE: 98 MMHG

## 2020-12-05 VITALS — SYSTOLIC BLOOD PRESSURE: 128 MMHG | DIASTOLIC BLOOD PRESSURE: 89 MMHG

## 2020-12-05 VITALS — SYSTOLIC BLOOD PRESSURE: 175 MMHG | DIASTOLIC BLOOD PRESSURE: 102 MMHG

## 2020-12-05 VITALS — SYSTOLIC BLOOD PRESSURE: 163 MMHG | DIASTOLIC BLOOD PRESSURE: 114 MMHG

## 2020-12-05 VITALS — DIASTOLIC BLOOD PRESSURE: 78 MMHG | SYSTOLIC BLOOD PRESSURE: 121 MMHG

## 2020-12-05 VITALS — SYSTOLIC BLOOD PRESSURE: 159 MMHG | DIASTOLIC BLOOD PRESSURE: 126 MMHG

## 2020-12-05 LAB
ANION GAP SERPL CALCULATED.3IONS-SCNC: 7 MMOL/L (ref 5–15)
BUN SERPL-MCNC: 28 MG/DL (ref 7–18)
BUN/CREAT SERPL: 35.4
CALCIUM SERPL-MCNC: 8.6 MG/DL (ref 8.5–10.1)
CHLORIDE SERPL-SCNC: 104 MMOL/L (ref 98–107)
CO2 SERPL-SCNC: 26 MMOL/L (ref 21–32)
GLUCOSE SERPL-MCNC: 136 MG/DL (ref 74–106)
MAGNESIUM SERPL-MCNC: 2.1 MG/DL (ref 1.6–2.6)
POTASSIUM SERPL-SCNC: 3.9 MMOL/L (ref 3.5–5.1)
SODIUM SERPL-SCNC: 137 MMOL/L (ref 136–145)

## 2020-12-05 RX ADMIN — METOPROLOL TARTRATE SCH MG: 50 TABLET, FILM COATED ORAL at 22:02

## 2020-12-05 RX ADMIN — HYDRALAZINE HYDROCHLORIDE PRN MG: 20 INJECTION INTRAMUSCULAR; INTRAVENOUS at 03:27

## 2020-12-05 RX ADMIN — DOCUSATE SODIUM LIQUID SCH MG: 100 LIQUID ORAL at 09:54

## 2020-12-05 RX ADMIN — HALOPERIDOL SCH MG: 1 TABLET ORAL at 21:59

## 2020-12-05 RX ADMIN — HALOPERIDOL LACTATE PRN MG: 5 INJECTION, SOLUTION INTRAMUSCULAR at 08:36

## 2020-12-05 RX ADMIN — HALOPERIDOL LACTATE PRN MG: 5 INJECTION, SOLUTION INTRAMUSCULAR at 20:08

## 2020-12-05 RX ADMIN — METOPROLOL TARTRATE SCH MG: 50 TABLET, FILM COATED ORAL at 09:56

## 2020-12-05 RX ADMIN — HALOPERIDOL SCH MG: 1 TABLET ORAL at 09:51

## 2020-12-05 RX ADMIN — OXYCODONE HYDROCHLORIDE AND ACETAMINOPHEN SCH MG: 500 TABLET ORAL at 09:56

## 2020-12-05 RX ADMIN — DOCUSATE SODIUM LIQUID SCH MG: 100 LIQUID ORAL at 21:58

## 2020-12-05 RX ADMIN — ENOXAPARIN SODIUM SCH MG: 100 INJECTION SUBCUTANEOUS at 21:58

## 2020-12-05 RX ADMIN — OXYCODONE HYDROCHLORIDE AND ACETAMINOPHEN SCH MG: 500 TABLET ORAL at 21:59

## 2020-12-05 RX ADMIN — Medication SCH UNIT: at 09:56

## 2020-12-05 RX ADMIN — PANTOPRAZOLE SODIUM SCH MG: 40 INJECTION, POWDER, FOR SOLUTION INTRAVENOUS at 09:54

## 2020-12-05 RX ADMIN — ENOXAPARIN SODIUM SCH MG: 100 INJECTION SUBCUTANEOUS at 09:51

## 2020-12-05 NOTE — NUR
CLOSING

PATIENT RESTLESS IN BED, HEART RATE 'S. PATIENT YELLING OUT PROFANITIES. PATIENT 
REMAINS ON 12L OXYMIZER.

REPORT ENDORSED TO DAYSHIFT FLORENCE CORDOVA.

## 2020-12-05 NOTE — NUR
Respiratory note:

PT IN NO RESPIRATORY DISTRESS, PRN MED NEB TX'S NOT INDICATED. PT REMAINS NON-COMPLIANT WITH 
WEAR O2 DEVICES. PT IN NO RESPIRATORY DISTRESS, WILL CONTINUE TO MONITOR.

## 2020-12-05 NOTE — NUR
PT GOT OUT OF BED WITHOUT ASSISTANCE, PT WAS YELLING, PROFANITIES,  TOOK OFF HIS OXYMIZER 
MASK AND EKG, MONITORING DEVICES, PT WAS DIAPHORETIC, SKIN WAS WET IN SWEAT, SKIN COLOR WAS 
CYANOTIC HR IN 'S WHEN PLACED BACK ON MONITOR AND SPO2 BACK ON THE 84 % RANGE WHEN 
BACK ON OXYMIZER AT 12 L/MIN PT WAS SITTING ON A CHAIR AND WAS C/O SAYING THAT " I WANT TO 
GO HOME, MY DOCTOR TOLD ME I CAN GO HOME TODAY, I DON'T WANT TO BE HERE ANY MORE". PT WAS 
FIGHTING NOT TO BE PLACED BACK IN MONITOR DEVICES. PT HAD TAKEN OFF HIS CENTRAL LINE 
DRESSING, CENTRAL LINE WAS STILL IN PLACE. I RE- ORIENTED PT TO PLACE AND TIME AND EDUCATED 
ON POC AND TOLD HIM HE MAY REMAIN ON CHAIR BUT HE NEEDS TO KEEP MONITORING DEVICES ON UNTIL 
MD  TO SEE HIM AND DETERMINE IF HE WILL BE DISCHARGED HOME TODAY.  PT CALMED DOWN AND 
ALLOWED FOR MONITORING DEVICES TO REMAIN ON.

## 2020-12-05 NOTE — NUR
PT WAS C/O FEELING HUNGRY WHILE FEEDING HIM HE VOIDED IN THE URINAL ONCE AND ALSO C/O OF 
ALREADY HAVING HAD AN INCONTINENCE OF BM. COMPLETE BED BATH GIVEN. ALL LINEN CHANGE. 
REPOSITIONED FOR COMFORT. PT C/O DISCOMFORT WHILE ASSISTING TO TURN FROM SIDE TO SIDE, HE 
GETS SHORT OF BREATH AND STARTS COUGHING UP. SPO2 DROPS TO MID 80'S PT DOES GET DIAPHORETIC 
BUT EASILY RECOVERS ONCE HIS HEAD IS BACK UP. PT IS MORE AGGRESSIVE WHEN HE HAS TO ASSIST 
WITH ADL'S AND LESS COMPLIANT. GETS TACHYPNEIC AND WORKED UP. HE IS CONSTANTLY WANTING TO 
DRINK WATER, MILK AND APPLE JUICE, HIS APPETITE IS INCREASING. HIS URINE STILL REMAINS A 
DARK SUGEY COLOR.

## 2020-12-05 NOTE — NUR
INCONTINENT

PATIENT YELLING PROFANITY, HEART RATE 'S.

AFTER THIS RN DONNED PPE AND ENTERED PATIENT ROOM, PATIENT EXPRESSED THAT HE COULDN'T REACH 
THE URINAL AND URINATED IN BED. PATIENT GIVEN PARTIAL LINEN CHANGE, ABLE TO ASSIST WITH 
TURNING.

## 2020-12-05 NOTE — NUR
THERMOREGULATION

PT HAS A TEMP .3

ICE PCK APPLIED TO BOTH ARM PITS, BOTH GROINS AND BEHIND PT'S NECK.

## 2020-12-05 NOTE — NUR
AM ASSESSMENT COMPLETED.

ST, TACHYPNEIC, RR MID 30'S TO LOW 40'S , HAS A FREQUENT MOIST COUGH, LS DIMINISHED THROUGH 
OUT. AFEBRILE. CYANOTIC IN LOWER EXTREMITIES, INCONTINENT OF URINE AND  BM. SKIN IS INTACT. 
PT NEEDS ASSISTANCE WITH EATING AND AMBULATION. PT NEEDS A SITTER. PT IS IN ICU WAITING FOR 
A TELE BED.

## 2020-12-05 NOTE — NUR
DR. LINDSAY ROUNDED ON PT. I UPDATED HIM ON PT'S CONDITION.

I TOLD HIM PT WAS FINALLY RESTING AFTER A VERY AGITATED AND AGGRESSIVE MORNING TOWARD ALL 
STAFF. NO NEW ORDERS RECEIVED. MD AWARE THAT PT HAD TO BE MEDICATED WITH HALOPERIDOL BOTH IM 
AND PO THIS AM AS RX, ON PRN ONE SCHEDULED.

## 2020-12-05 NOTE — NUR
AM CARE

PATIENT GIVEN COMPLETE LINEN CHANGE. PATIENT BECAME AGGRESSIVE DURING LINEN CHANGE AND BEGAN 
YELLING PROFANITY. PATIENT YELLING, "FUCK, HURRY UP, I'M TIRED". PATIENT ATTEMPTED TO SWING 
LEGS OVER RAILS TO GET OUT OF BED. EDUCATED PATIENT THAT HE IS NOT STRONG ENOUGH TO GET OUT 
OF BED AT THIS TIME. PATIENT STILL YELLING PROFANITY. PATIENT REFUSED CHG WIPES AT THIS TIME 
AND STATES, "I'M GOING HOME TODAY ANYWAY. I DON'T NEED THAT". COMPLETE LINEN CHANGE DONE. 
PATIENT REFUSED CHG WIPES AND GOWN CHANGE.

## 2020-12-05 NOTE — NUR
THERMOREGULATION 

TEMP 100.0 PO 

ICE PACK COOLING MEASURES IMPLEMENTED. APPLIED ICE PACKS  TOP BOTH ARM PITS, BOTH GROINS AND 
BEHIND PT'S HEAD. PT VOIDED AND , PARTIALLY MISSED THE URINAL, PT HAD TO HAVE A PARTIAL BATH 
AND A GOWN CHANGED BY FLORENCE RUSH. PT REQUESTING MORE APPLE JUICES WITH ICE.

## 2020-12-06 VITALS — SYSTOLIC BLOOD PRESSURE: 157 MMHG | DIASTOLIC BLOOD PRESSURE: 99 MMHG

## 2020-12-06 VITALS — SYSTOLIC BLOOD PRESSURE: 168 MMHG | DIASTOLIC BLOOD PRESSURE: 99 MMHG

## 2020-12-06 VITALS — DIASTOLIC BLOOD PRESSURE: 83 MMHG | SYSTOLIC BLOOD PRESSURE: 126 MMHG

## 2020-12-06 VITALS — SYSTOLIC BLOOD PRESSURE: 118 MMHG | DIASTOLIC BLOOD PRESSURE: 82 MMHG

## 2020-12-06 VITALS — DIASTOLIC BLOOD PRESSURE: 83 MMHG | SYSTOLIC BLOOD PRESSURE: 133 MMHG

## 2020-12-06 VITALS — DIASTOLIC BLOOD PRESSURE: 59 MMHG | SYSTOLIC BLOOD PRESSURE: 138 MMHG

## 2020-12-06 VITALS — SYSTOLIC BLOOD PRESSURE: 137 MMHG | DIASTOLIC BLOOD PRESSURE: 97 MMHG

## 2020-12-06 RX ADMIN — DOCUSATE SODIUM LIQUID SCH MG: 100 LIQUID ORAL at 21:27

## 2020-12-06 RX ADMIN — ENOXAPARIN SODIUM SCH MG: 100 INJECTION SUBCUTANEOUS at 10:20

## 2020-12-06 RX ADMIN — ENOXAPARIN SODIUM SCH MG: 60 INJECTION SUBCUTANEOUS at 21:26

## 2020-12-06 RX ADMIN — METOPROLOL TARTRATE SCH MG: 50 TABLET, FILM COATED ORAL at 21:26

## 2020-12-06 RX ADMIN — PANTOPRAZOLE SODIUM SCH MG: 40 INJECTION, POWDER, FOR SOLUTION INTRAVENOUS at 10:11

## 2020-12-06 RX ADMIN — Medication SCH UNIT: at 10:20

## 2020-12-06 RX ADMIN — HALOPERIDOL SCH MG: 1 TABLET ORAL at 21:26

## 2020-12-06 RX ADMIN — DOCUSATE SODIUM LIQUID SCH MG: 100 LIQUID ORAL at 10:00

## 2020-12-06 RX ADMIN — GUAIFENESIN AND DEXTROMETHORPHAN PRN ML: 100; 10 SYRUP ORAL at 19:54

## 2020-12-06 RX ADMIN — METOPROLOL TARTRATE SCH MG: 50 TABLET, FILM COATED ORAL at 10:20

## 2020-12-06 RX ADMIN — GUAIFENESIN AND DEXTROMETHORPHAN PRN ML: 100; 10 SYRUP ORAL at 06:48

## 2020-12-06 RX ADMIN — OXYCODONE HYDROCHLORIDE AND ACETAMINOPHEN SCH MG: 500 TABLET ORAL at 10:20

## 2020-12-06 RX ADMIN — HALOPERIDOL SCH MG: 1 TABLET ORAL at 13:35

## 2020-12-06 RX ADMIN — OXYCODONE HYDROCHLORIDE AND ACETAMINOPHEN SCH MG: 500 TABLET ORAL at 21:25

## 2020-12-06 NOTE — NUR
Opening Shift Note



Assumed care of patient, awake and alert bed is locked and in lowest position bed rails up 
x2 , call light is with in reach , sitter at bedside.  No S/S of distress/SOB or pain.  
Instructed on POC and to call for assistance PRN, will continue to monitor for changes Q1hr 
and PRN.

## 2020-12-06 NOTE — NUR
Patient arrived to unit

Patient arrived to unit after SBAR received. Patient is asleep, awakens to voice. No signs 
or symptoms of distress noted. Sitter at bedside. Will continue to monitor.

## 2020-12-06 NOTE — NUR
Nutrition Followup Note



Wt: 95.9 kg



Pt is positive for COVID in isolation. per RN pt with no disrtess noted. pt is currently on 
cardiac diet with adequate PO of 75% x 4 per RN doc 



Est energy needs 2818-8040 kcal (14-18 kcal/kg .4kg), Est protein needs 75-98g 
(1-1.3g/kg IBW 75kg). Will monitor and reassess prn. 



Labs: BUN 28 H G;U 136 H



BM:  Pt with 1 BM today per RN note 



Skin:  BS 13 mod risk, full details in RN wound care note 

     

PES:  

1) Obesity r/t caloric intake excess of needs aeb pt with a BMI of 35.9kg/m2

2) Inadequate oral intake r/t current medical condition aeb pt NPO, intubated and sedated



Comments: Will continue to monitor PO status, skin status, pertinent labs and weight trends. 
Will f/u in 3-5 days



Rec: 1)  Refer pt to OPD on DC. 2) Continue current plan of care

## 2020-12-06 NOTE — NUR
Opening Shift Note

Received report and assumed care of patient. Patient is awake and alert. No signs or 
symptoms of distress noted. Instructed patient on plan of care and to call for assistance as 
needed. Sitter at bedside. Will continue to monitor.

## 2020-12-07 VITALS — DIASTOLIC BLOOD PRESSURE: 97 MMHG | SYSTOLIC BLOOD PRESSURE: 145 MMHG

## 2020-12-07 VITALS — SYSTOLIC BLOOD PRESSURE: 127 MMHG | DIASTOLIC BLOOD PRESSURE: 91 MMHG

## 2020-12-07 VITALS — SYSTOLIC BLOOD PRESSURE: 141 MMHG | DIASTOLIC BLOOD PRESSURE: 106 MMHG

## 2020-12-07 VITALS — DIASTOLIC BLOOD PRESSURE: 84 MMHG | SYSTOLIC BLOOD PRESSURE: 119 MMHG

## 2020-12-07 LAB
ANION GAP SERPL CALCULATED.3IONS-SCNC: 8 MMOL/L (ref 5–15)
BUN SERPL-MCNC: 18 MG/DL (ref 7–18)
BUN/CREAT SERPL: 23.7
CALCIUM SERPL-MCNC: 8.3 MG/DL (ref 8.5–10.1)
CHLORIDE SERPL-SCNC: 99 MMOL/L (ref 98–107)
CO2 SERPL-SCNC: 25 MMOL/L (ref 21–32)
GLUCOSE SERPL-MCNC: 130 MG/DL (ref 74–106)
HCT VFR BLD AUTO: 42.5 % (ref 41–53)
HGB BLD-MCNC: 14.2 G/DL (ref 13.5–17.5)
MCH RBC QN AUTO: 31.5 PG (ref 28–32)
MCV RBC AUTO: 94.3 FL (ref 80–100)
NRBC BLD QL AUTO: 0.2 %
POTASSIUM SERPL-SCNC: 4 MMOL/L (ref 3.5–5.1)
SODIUM SERPL-SCNC: 132 MMOL/L (ref 136–145)

## 2020-12-07 PROCEDURE — 5A09357 ASSISTANCE WITH RESPIRATORY VENTILATION, LESS THAN 24 CONSECUTIVE HOURS, CONTINUOUS POSITIVE AIRWAY PRESSURE: ICD-10-PCS | Performed by: HOSPITALIST

## 2020-12-07 RX ADMIN — METOPROLOL TARTRATE SCH MG: 50 TABLET, FILM COATED ORAL at 10:07

## 2020-12-07 RX ADMIN — DOCUSATE SODIUM LIQUID SCH MG: 100 LIQUID ORAL at 10:00

## 2020-12-07 RX ADMIN — PANTOPRAZOLE SODIUM SCH MG: 40 INJECTION, POWDER, FOR SOLUTION INTRAVENOUS at 10:05

## 2020-12-07 RX ADMIN — MORPHINE SULFATE PRN MG: 2 INJECTION, SOLUTION INTRAMUSCULAR; INTRAVENOUS at 13:30

## 2020-12-07 RX ADMIN — HALOPERIDOL SCH MG: 1 TABLET ORAL at 09:24

## 2020-12-07 RX ADMIN — ENOXAPARIN SODIUM SCH MG: 60 INJECTION SUBCUTANEOUS at 10:06

## 2020-12-07 RX ADMIN — OXYCODONE HYDROCHLORIDE AND ACETAMINOPHEN SCH MG: 500 TABLET ORAL at 10:06

## 2020-12-07 RX ADMIN — Medication SCH UNIT: at 10:06

## 2020-12-07 RX ADMIN — MORPHINE SULFATE PRN MG: 2 INJECTION, SOLUTION INTRAMUSCULAR; INTRAVENOUS at 18:11

## 2020-12-07 RX ADMIN — GUAIFENESIN AND DEXTROMETHORPHAN PRN ML: 100; 10 SYRUP ORAL at 01:07

## 2020-12-07 RX ADMIN — GUAIFENESIN AND DEXTROMETHORPHAN PRN ML: 100; 10 SYRUP ORAL at 07:00

## 2020-12-07 NOTE — NUR
Pulmonologist 

MD Grace at bedside, aware of patient status. Per MD Grace, patient is to be 
upgraded to RUTH for reintubation. Per MD Grace, he "cannot intubate patient at this 
time." Will notify charge nurse of need for RUTH Bed and cont to monitor patient.

## 2020-12-07 NOTE — NUR
AT BEDSIDE TO ASSESS PT. PT CALMLY LAYING ON RIGHT SIDE DOWN, ON NRB AT POX 92-93%. RN 
MARTI COMMUNICATED PTS POX WAS CONSISTENT IN MID 70'S. PT HAS BEEN MEDICATED AND 
REPOSITIONED BY RNS, AND IS NOW CALM AND SATURATING ABOVE 90%. RNS AWARE RTS CAN BE PAGED AT 
ANY TIME THERE IS ANOTHER CONCERN WITH PTS BREATHING. RT LEAD KEITH REYES COMMUNICATED ON PTS 
PRESENTATION.

## 2020-12-07 NOTE — NUR
[facility]

SPOKE WITH MEHREEN AND THEY ARE WILLING TO DO A COURTESY HOLD UNTIL FAMILY CAN  
REMAINS. WAITING FOR  TO CALL BACK 

TO RELEASE BODY.

## 2020-12-07 NOTE — NUR
Mooney catheter insertion

Patient assessed and determined to be in need of mooney catheter. Order obtained from  MD Candelario. Patient educated on catheter and reason for insertion. All questions answered. Mooney 
catheter 16 guage [place]er inserted with clean sterile technique. Patient tolerated well.

## 2020-12-07 NOTE — NUR
PULMONOLOGY 

Paged MD Grace in regards to patients change is status. Awaiting call back at this time.

## 2020-12-07 NOTE — NUR
Charge 

Spoke to Parmjit Hill in regarding to upgrade to ICU status. Per MD Hill, she will notify 
House sup. Will cont to monitor patient.

## 2020-12-07 NOTE — NUR
MD ESPITIA RETURNED CALL ON BEHALF OF PATIENTS PRIMARY MD OF PT STATUS. INFORMED MD ESPITIA OF 
TOD AND DIAGNOSIS.

## 2020-12-07 NOTE — NUR
Opening Shift Note

Assumed care of patient, awake and alert. Patient found to be in respiratory distress with 
an O2 saturation of low to mid 80's with a HR in the 130's. Will attempt giving available 
PRN's to calm patient down and attempt to get O2 Sats to normal level.  Instructed on POC 
and to call for assist PRN, will continue to monitor for changes Q1hr and PRN.

## 2020-12-07 NOTE — NUR
[facility]

[facility] CALLED. THIS RN WAS INFORMED THAT PATIENT IS RELEASED DUE TO PATIENT BEING COVID 
POSITIVE. REFERENCE# [id number]

## 2020-12-07 NOTE — NUR
Charge 

Spoke to Charge Nurse Liz, per Liz, lead RT "Sara will come and assess the patient and 
determine whether patient will be intubated or not." Will cont to monitor patient.

## 2020-12-07 NOTE — NUR
Respiratory Distress

Patient found to be in respiratory distress, with O2 Saturations in the 70's and trending 
down to low 50's. Patient removed BIPAP mask. Refusing to keep it on. Attempts to place 
patient on BIPAP made multiple times. RT at bedside. Unsuccessful attempts to bring O2 Sats 
up. Code assist called. NP Luba at bedside for intubation. Successful intubation. patient 
will be transferred to RUTH.

## 2020-12-07 NOTE — NUR
Paged RT? Repositioned patient 

Patient's oxygen saturation maintaining in the mid 80s occasionally dropping to the 70s. 
Patient turned to side after being unable to tolerate proning. Patient's oxygen saturations 
now approximately 85-91. Patient on 10L via nasal cannula and 15L nonrebreather mask. Will 
continue to monitor.

## 2020-12-07 NOTE — NUR
PT tx held today per RN.

-------------------------------------------------------------------------------

Addendum: 12/07/20 at 1311 by James Chilel PTA

-------------------------------------------------------------------------------

Amended: Links added.

## 2020-12-07 NOTE — NUR
CODE BLUE

PT ARRIVED IN RUTH ROOM 265 AT 2025. PT TRANSFERRED TO RUTH BED. HR DECREASED FROM 150BPM TO 
50BPM AND PULSE LOST. CODE BLUE CALLED. SEE CODE BLUE SHEET FOR MORE DETAILS.

## 2020-12-07 NOTE — NUR
FAMILY

SPOKE WITH PARISH, PATIENTS WIFE. SHE WAS ALREADY INFORMED OF PATIENTS PASSING. SHE SAID 
HER FATHER INLAW "DENVER" WAS GOING TO BE MAKING ARRANGEMENTS FOR HIS BODY. PARISH STATED 
SHE COULD NOT COME DOWN TO SEE BODY THAT SHE WAS INFECTED HERSELF AND HUNG UP.

## 2020-12-07 NOTE — NUR
Paged RT/Dr. Candelario

Paged RT regarding patient's oxygen saturation fluctuating in 80s to low 90s occasionally 
dropping to the 70s despite being on 10L via nasal cannula and 15L nonrebreather mask. 
Increased work of breathing noted. Patient also reports feeling anxious. 

Paged Dr. Candelario, connected to DR. Lara.

Received new orders for Xanax 0.5mg PO ONCE and BIPAP.

Orders read back and verified.

Administered medication per MD order.

Per Lead RT, BIPAP is not indicated at this time. Will continue to monitor.

## 2020-12-07 NOTE — NUR
Charge 

Spoke to charge nurse Liz. Per Liz, RT Sara stated "there is only one ventilator left 
in the hospital and we have no ICU or RUTH beds at this time. MD Candelario aware. Will cont to 
monitor patient.

## 2020-12-07 NOTE — NUR
PT PRESENTS IN RESPIRATOY DISTRESS WITH INCREASING AGITATION AND A FAILURE TO MAINTAIN 
AIRWAY. PT INTUBATED BY ENMA RAMOS WITH AN 8.0 ETT AND SECURED BY ANISHA AT 24 UPPER LIP. 
CUFF PRESSURE IS FIRM. TUBE PLACEMENT CONFIRMED BY POSITIVE CAPNOMETER COLOR CHANGE, 
CONDENSATION IN THE TUBING, AND BILATERAL CHEST RISE. PT AWAITING TRANSFER TO RUTH. WILL DRAW 
POST INTUBATION ABG.

## 2020-12-07 NOTE — NUR
Dr. Kodak Candelario called back. Updated MD regarding patient's status.

Received new order for Ativan 1mg PO BID. Order read back and verified. Will endorse.

## 2020-12-07 NOTE — NUR
[facility]

SPOKE WITH MEHREEN IN REGARDS TO A COURTESY HOLD UNTIL FAMILY CAN MAKE ARRANGEMENTS. MEHREEN 
SAID SHE WILL CALL BACK.

## 2020-12-07 NOTE — NUR
Physician 

MD Candelario at bedside, aware of patient status. New order received at this time. Will cont to 
monitor patient.

## 2020-12-07 NOTE — NUR
Physician 

Spoke to MD Candelario in regards to patient change in status and refusing to keep masks on. Per 
MD Candelario upgrade patient to ICU, give ativan, morphine, and inform pulmonology on case in 
regards to patients O2 Demand. Orders read back and verified. Will cont to monitor patient.

## 2020-12-08 VITALS — SYSTOLIC BLOOD PRESSURE: 126 MMHG | DIASTOLIC BLOOD PRESSURE: 72 MMHG

## 2020-12-08 PROCEDURE — 5A12012 PERFORMANCE OF CARDIAC OUTPUT, SINGLE, MANUAL: ICD-10-PCS | Performed by: HOSPITALIST

## 2020-12-08 NOTE — NUR
DIANNA.  JARON GARCIA CALLED. PATIENT IS CLEARED AND CORONERS WILL FORWARD 
CASE TO PUBLIC HEALTH DEPARTMENT. NO REPORT #NEEDED ACCORDING TO DEPUTY GARCIA. 



WILL CONTACT [employer] [facility] TO  BODY AND HOLD UNTIL FAMILY IS ABLE TO MAKE 
ARRANGEMENTS. 

ATTEMPTED TO CONTACT PARISH "WIFE". NO ANSWER AND ANSWERING MACHINE IS FULL. WILL CONTINUE 
TO REACH FAMILY. 



SPOKE WITH MEHREEN FROM [employer]. TRANSPORT TO  PATIENT IS ALL SET.

## 2022-06-21 NOTE — NUR
Family updated on pt status

Family of TEE BRUNER updated on patient's status and condition. All questions and concerns 
addressed. Wife verbalized understanding. 18F [place] catheter